# Patient Record
Sex: FEMALE | Race: WHITE | NOT HISPANIC OR LATINO
[De-identification: names, ages, dates, MRNs, and addresses within clinical notes are randomized per-mention and may not be internally consistent; named-entity substitution may affect disease eponyms.]

---

## 2017-02-06 ENCOUNTER — MEDICATION RENEWAL (OUTPATIENT)
Age: 32
End: 2017-02-06

## 2017-03-15 ENCOUNTER — LABORATORY RESULT (OUTPATIENT)
Age: 32
End: 2017-03-15

## 2017-03-16 ENCOUNTER — APPOINTMENT (OUTPATIENT)
Dept: OBGYN | Facility: CLINIC | Age: 32
End: 2017-03-16

## 2017-03-16 VITALS
WEIGHT: 117 LBS | SYSTOLIC BLOOD PRESSURE: 100 MMHG | DIASTOLIC BLOOD PRESSURE: 68 MMHG | HEIGHT: 62 IN | BODY MASS INDEX: 21.53 KG/M2

## 2017-03-23 DIAGNOSIS — Z87.898 PERSONAL HISTORY OF OTHER SPECIFIED CONDITIONS: ICD-10-CM

## 2017-03-23 LAB — HPV HIGH+LOW RISK DNA PNL CVX: POSITIVE

## 2017-09-07 ENCOUNTER — RX RENEWAL (OUTPATIENT)
Age: 32
End: 2017-09-07

## 2017-09-18 ENCOUNTER — MEDICATION RENEWAL (OUTPATIENT)
Age: 32
End: 2017-09-18

## 2017-10-09 ENCOUNTER — MEDICATION RENEWAL (OUTPATIENT)
Age: 32
End: 2017-10-09

## 2017-11-02 ENCOUNTER — APPOINTMENT (OUTPATIENT)
Dept: OBGYN | Facility: CLINIC | Age: 32
End: 2017-11-02
Payer: COMMERCIAL

## 2017-11-02 VITALS
HEIGHT: 62 IN | WEIGHT: 117 LBS | DIASTOLIC BLOOD PRESSURE: 70 MMHG | SYSTOLIC BLOOD PRESSURE: 110 MMHG | BODY MASS INDEX: 21.53 KG/M2

## 2017-11-02 PROCEDURE — 99213 OFFICE O/P EST LOW 20 MIN: CPT

## 2017-11-15 ENCOUNTER — RESULT REVIEW (OUTPATIENT)
Age: 32
End: 2017-11-15

## 2017-11-16 LAB
CYTOLOGY CVX/VAG DOC THIN PREP: NORMAL
HPV HIGH+LOW RISK DNA PNL CVX: DETECTED

## 2017-12-21 ENCOUNTER — APPOINTMENT (OUTPATIENT)
Dept: OBGYN | Facility: CLINIC | Age: 32
End: 2017-12-21
Payer: COMMERCIAL

## 2017-12-21 VITALS — DIASTOLIC BLOOD PRESSURE: 70 MMHG | SYSTOLIC BLOOD PRESSURE: 110 MMHG

## 2017-12-21 LAB
HCG UR QL: NEGATIVE
QUALITY CONTROL: YES

## 2017-12-21 PROCEDURE — 57454 BX/CURETT OF CERVIX W/SCOPE: CPT

## 2017-12-21 PROCEDURE — 81025 URINE PREGNANCY TEST: CPT

## 2018-01-05 LAB — CORE LAB BIOPSY: NORMAL

## 2018-05-24 ENCOUNTER — APPOINTMENT (OUTPATIENT)
Dept: OBGYN | Facility: CLINIC | Age: 33
End: 2018-05-24
Payer: COMMERCIAL

## 2018-05-24 VITALS
HEIGHT: 62 IN | WEIGHT: 125 LBS | SYSTOLIC BLOOD PRESSURE: 110 MMHG | DIASTOLIC BLOOD PRESSURE: 70 MMHG | BODY MASS INDEX: 23 KG/M2

## 2018-05-24 PROCEDURE — 99395 PREV VISIT EST AGE 18-39: CPT

## 2018-05-30 LAB
CYTOLOGY CVX/VAG DOC THIN PREP: NORMAL
HPV HIGH+LOW RISK DNA PNL CVX: NOT DETECTED

## 2018-11-26 ENCOUNTER — MEDICATION RENEWAL (OUTPATIENT)
Age: 33
End: 2018-11-26

## 2018-12-19 ENCOUNTER — MEDICATION RENEWAL (OUTPATIENT)
Age: 33
End: 2018-12-19

## 2018-12-26 ENCOUNTER — MEDICATION RENEWAL (OUTPATIENT)
Age: 33
End: 2018-12-26

## 2019-01-17 ENCOUNTER — APPOINTMENT (OUTPATIENT)
Dept: OBGYN | Facility: CLINIC | Age: 34
End: 2019-01-17
Payer: COMMERCIAL

## 2019-01-17 VITALS
BODY MASS INDEX: 22.26 KG/M2 | DIASTOLIC BLOOD PRESSURE: 65 MMHG | WEIGHT: 121 LBS | SYSTOLIC BLOOD PRESSURE: 100 MMHG | HEIGHT: 62 IN

## 2019-01-17 PROCEDURE — 99213 OFFICE O/P EST LOW 20 MIN: CPT

## 2019-01-17 NOTE — HISTORY OF PRESENT ILLNESS
[6 Months Ago] : 6 months ago [Good] : being in good health [Healthy Diet] : a healthy diet [Regular Exercise] : regular exercise [Reproductive Age] : is of reproductive age [Sexually Active] : is sexually active [Menstrual Problems] : reports normal menses

## 2019-01-17 NOTE — PHYSICAL EXAM
[Awake] : awake [Alert] : alert [Soft] : soft [Oriented x3] : oriented to person, place, and time [Acute Distress] : no acute distress [Mass] : no breast mass [Nipple Discharge] : no nipple discharge [Axillary LAD] : no axillary lymphadenopathy [Tender] : non tender

## 2019-01-22 ENCOUNTER — MEDICATION RENEWAL (OUTPATIENT)
Age: 34
End: 2019-01-22

## 2019-05-30 ENCOUNTER — APPOINTMENT (OUTPATIENT)
Dept: OBGYN | Facility: CLINIC | Age: 34
End: 2019-05-30
Payer: COMMERCIAL

## 2019-05-30 ENCOUNTER — LABORATORY RESULT (OUTPATIENT)
Age: 34
End: 2019-05-30

## 2019-05-30 VITALS
WEIGHT: 124 LBS | DIASTOLIC BLOOD PRESSURE: 60 MMHG | BODY MASS INDEX: 22.82 KG/M2 | SYSTOLIC BLOOD PRESSURE: 110 MMHG | HEIGHT: 62 IN

## 2019-05-30 PROCEDURE — 99395 PREV VISIT EST AGE 18-39: CPT

## 2019-05-30 NOTE — COUNSELING
[Breast Self Exam] : breast self exam [Nutrition] : nutrition [Exercise] : exercise [Vitamins/Supplements] : vitamins/supplements [Contraception] : contraception [Preconception Care] : preconception care

## 2019-05-30 NOTE — HISTORY OF PRESENT ILLNESS
[1 Year Ago] : 1 year ago [Good] : being in good health [Healthy Diet] : a healthy diet [Regular Exercise] : regular exercise [Weight Concerns] : no concerns with her weight [Menstrual Problems] : reports normal menses [Up to Date] : up to date with ~his/her~ STD screening [Sexually Active] : is sexually active [Monogamous] : is monogamous [Male ___] : [unfilled] male

## 2019-06-10 LAB
CYTOLOGY CVX/VAG DOC THIN PREP: NORMAL
HPV HIGH+LOW RISK DNA PNL CVX: DETECTED

## 2019-07-08 ENCOUNTER — RX RENEWAL (OUTPATIENT)
Age: 34
End: 2019-07-08

## 2020-03-19 ENCOUNTER — APPOINTMENT (OUTPATIENT)
Dept: OBGYN | Facility: CLINIC | Age: 35
End: 2020-03-19

## 2020-06-04 ENCOUNTER — APPOINTMENT (OUTPATIENT)
Dept: OBGYN | Facility: CLINIC | Age: 35
End: 2020-06-04

## 2020-06-10 ENCOUNTER — APPOINTMENT (OUTPATIENT)
Dept: OBGYN | Facility: CLINIC | Age: 35
End: 2020-06-10
Payer: COMMERCIAL

## 2020-06-10 ENCOUNTER — RESULT CHARGE (OUTPATIENT)
Age: 35
End: 2020-06-10

## 2020-06-10 VITALS
DIASTOLIC BLOOD PRESSURE: 72 MMHG | WEIGHT: 125 LBS | TEMPERATURE: 98.4 F | SYSTOLIC BLOOD PRESSURE: 110 MMHG | HEIGHT: 62 IN | BODY MASS INDEX: 23 KG/M2

## 2020-06-10 LAB
HCG UR QL: POSITIVE
QUALITY CONTROL: YES

## 2020-06-10 PROCEDURE — 76830 TRANSVAGINAL US NON-OB: CPT

## 2020-06-10 PROCEDURE — 99395 PREV VISIT EST AGE 18-39: CPT | Mod: 25

## 2020-06-10 PROCEDURE — 81025 URINE PREGNANCY TEST: CPT

## 2020-06-10 NOTE — HISTORY OF PRESENT ILLNESS
[1 Year Ago] : 1 year ago [Good] : being in good health [Healthy Diet] : a healthy diet [Regular Exercise] : regular exercise [Weight Concerns] : no concerns with her weight [Menstrual Problems] : reports normal menses [Up to Date] : up to date with ~his/her~ STD screening [Monogamous] : is monogamous [Sexually Active] : is sexually active [Male ___] : [unfilled] male

## 2020-06-10 NOTE — COUNSELING
[Breast Self Exam] : breast self exam [Exercise] : exercise [Nutrition] : nutrition [Vitamins/Supplements] : vitamins/supplements [Contraception] : contraception [Preconception Care] : preconception care

## 2020-06-10 NOTE — REVIEW OF SYSTEMS
[Fever] : no fever [Pain] : no pain [Nosebleeds] : no nosebleeds [Nasal Discharge] : no nasal discharge [Cough] : no cough [SOB on Exertion] : no shortness of breath during exertion [Abdominal Pain] : no abdominal pain [Constipation] : no constipation [Diarrhea] : no diarrhea [Melena] : no melena [Nl] : Integumentary

## 2020-06-10 NOTE — CHIEF COMPLAINT
[Follow Up] : follow up GYN visit [FreeTextEntry1] : 34 yo G0 with LMP 4/16 presents with amenorrhea.

## 2020-06-16 LAB
C TRACH RRNA SPEC QL NAA+PROBE: NOT DETECTED
HPV HIGH+LOW RISK DNA PNL CVX: NOT DETECTED
N GONORRHOEA RRNA SPEC QL NAA+PROBE: NOT DETECTED
SOURCE AMPLIFICATION: NORMAL

## 2020-06-24 ENCOUNTER — APPOINTMENT (OUTPATIENT)
Dept: OBGYN | Facility: CLINIC | Age: 35
End: 2020-06-24
Payer: COMMERCIAL

## 2020-06-24 ENCOUNTER — NON-APPOINTMENT (OUTPATIENT)
Age: 35
End: 2020-06-24

## 2020-06-24 VITALS
SYSTOLIC BLOOD PRESSURE: 110 MMHG | TEMPERATURE: 98.7 F | WEIGHT: 127 LBS | HEIGHT: 62 IN | DIASTOLIC BLOOD PRESSURE: 68 MMHG | BODY MASS INDEX: 23.37 KG/M2

## 2020-06-24 PROCEDURE — 36415 COLL VENOUS BLD VENIPUNCTURE: CPT

## 2020-06-24 PROCEDURE — 0501F PRENATAL FLOW SHEET: CPT

## 2020-06-24 PROCEDURE — 76817 TRANSVAGINAL US OBSTETRIC: CPT

## 2020-06-24 PROCEDURE — 99213 OFFICE O/P EST LOW 20 MIN: CPT

## 2020-06-24 RX ORDER — NORGESTREL AND ETHINYL ESTRADIOL 0.3-0.03MG
0.3-3 KIT ORAL
Qty: 84 | Refills: 1 | Status: COMPLETED | COMMUNITY
Start: 2019-07-08 | End: 2020-06-24

## 2020-06-25 ENCOUNTER — TRANSCRIPTION ENCOUNTER (OUTPATIENT)
Age: 35
End: 2020-06-25

## 2020-06-26 DIAGNOSIS — B97.7 PAPILLOMAVIRUS AS THE CAUSE OF DISEASES CLASSIFIED ELSEWHERE: ICD-10-CM

## 2020-06-30 LAB
ABO + RH PNL BLD: NORMAL
AR GENE MUT ANL BLD/T: NEGATIVE
B19V IGG SER QL IA: 3.7 INDEX
B19V IGG+IGM SER-IMP: NORMAL
B19V IGG+IGM SER-IMP: POSITIVE
B19V IGM FLD-ACNC: 0.1
B19V IGM SER-ACNC: NEGATIVE
BASOPHILS # BLD AUTO: 0.01 K/UL
BASOPHILS NFR BLD AUTO: 0.1 %
BLD GP AB SCN SERPL QL: NORMAL
CFTR MUT TESTED BLD/T: NEGATIVE
EOSINOPHIL # BLD AUTO: 0.08 K/UL
EOSINOPHIL NFR BLD AUTO: 1.2 %
FMR1 GENE MUT ANL BLD/T: NORMAL
HBV SURFACE AG SER QL: NONREACTIVE
HCT VFR BLD CALC: 41.1 %
HCV AB SER QL: NONREACTIVE
HCV S/CO RATIO: 0.15 S/CO
HGB A MFR BLD: 97.5 %
HGB A2 MFR BLD: 2.5 %
HGB BLD-MCNC: 13.6 G/DL
HGB FRACT BLD-IMP: NORMAL
HIV1+2 AB SPEC QL IA.RAPID: NONREACTIVE
IMM GRANULOCYTES NFR BLD AUTO: 0.3 %
LYMPHOCYTES # BLD AUTO: 1.44 K/UL
LYMPHOCYTES NFR BLD AUTO: 21.4 %
MAN DIFF?: NORMAL
MCHC RBC-ENTMCNC: 30.9 PG
MCHC RBC-ENTMCNC: 33.1 GM/DL
MCV RBC AUTO: 93.4 FL
MONOCYTES # BLD AUTO: 0.51 K/UL
MONOCYTES NFR BLD AUTO: 7.6 %
NEUTROPHILS # BLD AUTO: 4.68 K/UL
NEUTROPHILS NFR BLD AUTO: 69.4 %
PLATELET # BLD AUTO: 310 K/UL
RBC # BLD: 4.4 M/UL
RBC # FLD: 11.9 %
RUBV IGG FLD-ACNC: 2 INDEX
RUBV IGG SER-IMP: POSITIVE
T GONDII AB SER-IMP: NEGATIVE
T GONDII AB SER-IMP: NEGATIVE
T GONDII IGG SER QL: <3 IU/ML
T GONDII IGM SER QL: <3 AU/ML
T PALLIDUM AB SER QL IA: NEGATIVE
TSH SERPL-ACNC: 1.63 UIU/ML
WBC # FLD AUTO: 6.74 K/UL

## 2020-07-10 ENCOUNTER — ASOB RESULT (OUTPATIENT)
Age: 35
End: 2020-07-10

## 2020-07-10 ENCOUNTER — APPOINTMENT (OUTPATIENT)
Dept: ANTEPARTUM | Facility: CLINIC | Age: 35
End: 2020-07-10
Payer: COMMERCIAL

## 2020-07-10 PROCEDURE — 76801 OB US < 14 WKS SINGLE FETUS: CPT

## 2020-07-10 PROCEDURE — 36416 COLLJ CAPILLARY BLOOD SPEC: CPT

## 2020-07-10 PROCEDURE — 76813 OB US NUCHAL MEAS 1 GEST: CPT | Mod: 59

## 2020-07-17 ENCOUNTER — APPOINTMENT (OUTPATIENT)
Dept: OBGYN | Facility: CLINIC | Age: 35
End: 2020-07-17
Payer: COMMERCIAL

## 2020-07-17 ENCOUNTER — NON-APPOINTMENT (OUTPATIENT)
Age: 35
End: 2020-07-17

## 2020-07-17 VITALS
DIASTOLIC BLOOD PRESSURE: 72 MMHG | SYSTOLIC BLOOD PRESSURE: 104 MMHG | TEMPERATURE: 98.6 F | WEIGHT: 127 LBS | HEIGHT: 62 IN | BODY MASS INDEX: 23.37 KG/M2

## 2020-07-17 LAB
BILIRUB UR QL STRIP: NORMAL
CLARITY UR: CLEAR
COLLECTION METHOD: NORMAL
GLUCOSE UR-MCNC: NORMAL
HCG UR QL: 0.2 EU/DL
HGB UR QL STRIP.AUTO: NORMAL
KETONES UR-MCNC: NORMAL
LEUKOCYTE ESTERASE UR QL STRIP: NORMAL
NITRITE UR QL STRIP: NORMAL
PH UR STRIP: 7
PROT UR STRIP-MCNC: NORMAL
SP GR UR STRIP: 1.01

## 2020-07-17 PROCEDURE — 0502F SUBSEQUENT PRENATAL CARE: CPT

## 2020-07-17 PROCEDURE — 81003 URINALYSIS AUTO W/O SCOPE: CPT | Mod: QW

## 2020-07-17 PROCEDURE — 59425 ANTEPARTUM CARE ONLY: CPT

## 2020-07-17 PROCEDURE — 36415 COLL VENOUS BLD VENIPUNCTURE: CPT

## 2020-07-23 ENCOUNTER — TRANSCRIPTION ENCOUNTER (OUTPATIENT)
Age: 35
End: 2020-07-23

## 2020-07-24 ENCOUNTER — TRANSCRIPTION ENCOUNTER (OUTPATIENT)
Age: 35
End: 2020-07-24

## 2020-07-30 LAB
CLARIM 15Q11.2: NORMAL
CLARIM 1P36: NORMAL
CLARIM 22Q11.2: NORMAL
CLARIM 4P-/WOLF-HIRSCHHORN: NORMAL
CLARIM 5P-/CRI DU CHAT: NORMAL
CLARIM ADDITIONAL INFO: NORMAL
CLARIM CHROMOSOME 13: NORMAL
CLARIM CHROMOSOME 18: NORMAL
CLARIM CHROMOSOME 21: NORMAL
CLARIM SEX CHROMOSOMES: NORMAL
CLARITEST NIPT W/MICRO: NORMAL

## 2020-08-18 ENCOUNTER — NON-APPOINTMENT (OUTPATIENT)
Age: 35
End: 2020-08-18

## 2020-08-18 ENCOUNTER — APPOINTMENT (OUTPATIENT)
Dept: OBGYN | Facility: CLINIC | Age: 35
End: 2020-08-18
Payer: COMMERCIAL

## 2020-08-18 VITALS
TEMPERATURE: 99 F | DIASTOLIC BLOOD PRESSURE: 74 MMHG | SYSTOLIC BLOOD PRESSURE: 110 MMHG | HEIGHT: 62 IN | BODY MASS INDEX: 24.48 KG/M2 | WEIGHT: 133 LBS

## 2020-08-18 PROCEDURE — 0502F SUBSEQUENT PRENATAL CARE: CPT

## 2020-08-18 PROCEDURE — 36415 COLL VENOUS BLD VENIPUNCTURE: CPT

## 2020-08-21 LAB
1ST TRIMESTER DATA: NORMAL
2ND TRIMESTER DATA: NORMAL
AFP PNL SERPL: NORMAL
AFP SERPL-ACNC: NORMAL
AFP SERPL-ACNC: NORMAL
B-HCG FREE SERPL-MCNC: NORMAL
CLINICAL BIOCHEMIST REVIEW: NORMAL
FREE BETA HCG 1ST TRIMESTER: NORMAL
INHIBIN A SERPL-MCNC: NORMAL
NOTES NTD: NORMAL
NT: NORMAL
PAPP-A SERPL-ACNC: NORMAL
U ESTRIOL SERPL-SCNC: NORMAL

## 2020-08-24 ENCOUNTER — TRANSCRIPTION ENCOUNTER (OUTPATIENT)
Age: 35
End: 2020-08-24

## 2020-08-31 ENCOUNTER — TRANSCRIPTION ENCOUNTER (OUTPATIENT)
Age: 35
End: 2020-08-31

## 2020-09-01 ENCOUNTER — TRANSCRIPTION ENCOUNTER (OUTPATIENT)
Age: 35
End: 2020-09-01

## 2020-09-04 ENCOUNTER — APPOINTMENT (OUTPATIENT)
Dept: ANTEPARTUM | Facility: CLINIC | Age: 35
End: 2020-09-04
Payer: COMMERCIAL

## 2020-09-04 ENCOUNTER — ASOB RESULT (OUTPATIENT)
Age: 35
End: 2020-09-04

## 2020-09-04 PROCEDURE — 76805 OB US >/= 14 WKS SNGL FETUS: CPT

## 2020-09-17 ENCOUNTER — NON-APPOINTMENT (OUTPATIENT)
Age: 35
End: 2020-09-17

## 2020-09-17 ENCOUNTER — APPOINTMENT (OUTPATIENT)
Dept: OBGYN | Facility: CLINIC | Age: 35
End: 2020-09-17
Payer: COMMERCIAL

## 2020-09-17 LAB
BILIRUB UR QL STRIP: NORMAL
CLARITY UR: CLEAR
COLLECTION METHOD: NORMAL
GLUCOSE UR-MCNC: NORMAL
HCG UR QL: 0.2 EU/DL
HGB UR QL STRIP.AUTO: NORMAL
KETONES UR-MCNC: NORMAL
LEUKOCYTE ESTERASE UR QL STRIP: NORMAL
NITRITE UR QL STRIP: NORMAL
PH UR STRIP: 7.5
PROT UR STRIP-MCNC: NORMAL
SP GR UR STRIP: 1.01

## 2020-09-17 PROCEDURE — 81003 URINALYSIS AUTO W/O SCOPE: CPT | Mod: QW

## 2020-09-17 PROCEDURE — 0502F SUBSEQUENT PRENATAL CARE: CPT

## 2020-09-18 ENCOUNTER — NON-APPOINTMENT (OUTPATIENT)
Age: 35
End: 2020-09-18

## 2020-09-28 ENCOUNTER — TRANSCRIPTION ENCOUNTER (OUTPATIENT)
Age: 35
End: 2020-09-28

## 2020-10-15 ENCOUNTER — NON-APPOINTMENT (OUTPATIENT)
Age: 35
End: 2020-10-15

## 2020-10-15 ENCOUNTER — APPOINTMENT (OUTPATIENT)
Dept: OBGYN | Facility: CLINIC | Age: 35
End: 2020-10-15
Payer: COMMERCIAL

## 2020-10-15 VITALS
DIASTOLIC BLOOD PRESSURE: 70 MMHG | SYSTOLIC BLOOD PRESSURE: 104 MMHG | WEIGHT: 140 LBS | BODY MASS INDEX: 25.76 KG/M2 | HEIGHT: 62 IN | TEMPERATURE: 98.9 F

## 2020-10-15 PROCEDURE — 90686 IIV4 VACC NO PRSV 0.5 ML IM: CPT

## 2020-10-15 PROCEDURE — 0502F SUBSEQUENT PRENATAL CARE: CPT

## 2020-10-15 PROCEDURE — 90471 IMMUNIZATION ADMIN: CPT

## 2020-10-18 LAB
BASOPHILS # BLD AUTO: 0.03 K/UL
BASOPHILS NFR BLD AUTO: 0.4 %
EOSINOPHIL # BLD AUTO: 0.11 K/UL
EOSINOPHIL NFR BLD AUTO: 1.5 %
GLUCOSE 1H P 50 G GLC PO SERPL-MCNC: 144 MG/DL
HCT VFR BLD CALC: 33.2 %
HGB BLD-MCNC: 11.2 G/DL
IMM GRANULOCYTES NFR BLD AUTO: 0.3 %
LYMPHOCYTES # BLD AUTO: 1.06 K/UL
LYMPHOCYTES NFR BLD AUTO: 14.9 %
MAN DIFF?: NORMAL
MCHC RBC-ENTMCNC: 31.5 PG
MCHC RBC-ENTMCNC: 33.7 GM/DL
MCV RBC AUTO: 93.3 FL
MONOCYTES # BLD AUTO: 0.49 K/UL
MONOCYTES NFR BLD AUTO: 6.9 %
NEUTROPHILS # BLD AUTO: 5.41 K/UL
NEUTROPHILS NFR BLD AUTO: 76 %
PLATELET # BLD AUTO: 259 K/UL
RBC # BLD: 3.56 M/UL
RBC # FLD: 12.6 %
SARS-COV-2 IGG SERPL IA-ACNC: 7.17 INDEX
SARS-COV-2 IGG SERPL QL IA: POSITIVE
WBC # FLD AUTO: 7.12 K/UL

## 2020-10-19 ENCOUNTER — TRANSCRIPTION ENCOUNTER (OUTPATIENT)
Age: 35
End: 2020-10-19

## 2020-10-19 ENCOUNTER — NON-APPOINTMENT (OUTPATIENT)
Age: 35
End: 2020-10-19

## 2020-10-22 LAB
GLUCOSE 1H P 100 G GLC PO SERPL-MCNC: 111 MG/DL
GLUCOSE 2H P CHAL SERPL-MCNC: 105 MG/DL
GLUCOSE 3H P CHAL SERPL-MCNC: 96 MG/DL
GLUCOSE BS SERPL-MCNC: 76 MG/DL

## 2020-11-03 ENCOUNTER — APPOINTMENT (OUTPATIENT)
Dept: ANTEPARTUM | Facility: CLINIC | Age: 35
End: 2020-11-03
Payer: COMMERCIAL

## 2020-11-03 ENCOUNTER — ASOB RESULT (OUTPATIENT)
Age: 35
End: 2020-11-03

## 2020-11-03 PROCEDURE — 76819 FETAL BIOPHYS PROFIL W/O NST: CPT

## 2020-11-03 PROCEDURE — 76816 OB US FOLLOW-UP PER FETUS: CPT

## 2020-11-03 PROCEDURE — 99072 ADDL SUPL MATRL&STAF TM PHE: CPT

## 2020-11-04 ENCOUNTER — NON-APPOINTMENT (OUTPATIENT)
Age: 35
End: 2020-11-04

## 2020-11-11 ENCOUNTER — APPOINTMENT (OUTPATIENT)
Dept: OBGYN | Facility: CLINIC | Age: 35
End: 2020-11-11
Payer: COMMERCIAL

## 2020-11-11 ENCOUNTER — NON-APPOINTMENT (OUTPATIENT)
Age: 35
End: 2020-11-11

## 2020-11-11 VITALS
WEIGHT: 143 LBS | RESPIRATION RATE: 16 BRPM | SYSTOLIC BLOOD PRESSURE: 102 MMHG | HEIGHT: 62 IN | BODY MASS INDEX: 26.31 KG/M2 | DIASTOLIC BLOOD PRESSURE: 54 MMHG

## 2020-11-11 PROCEDURE — 0502F SUBSEQUENT PRENATAL CARE: CPT

## 2020-12-05 ENCOUNTER — OUTPATIENT (OUTPATIENT)
Dept: INPATIENT UNIT | Facility: HOSPITAL | Age: 35
LOS: 1 days | Discharge: HOME | End: 2020-12-05
Payer: COMMERCIAL

## 2020-12-05 DIAGNOSIS — O26.899 OTHER SPECIFIED PREGNANCY RELATED CONDITIONS, UNSPECIFIED TRIMESTER: ICD-10-CM

## 2020-12-05 LAB
APPEARANCE UR: CLEAR — SIGNIFICANT CHANGE UP
APTT BLD: 27 SEC — LOW (ref 27.5–35.5)
BASOPHILS # BLD AUTO: 0.03 K/UL — SIGNIFICANT CHANGE UP (ref 0–0.2)
BASOPHILS NFR BLD AUTO: 0.3 % — SIGNIFICANT CHANGE UP (ref 0–2)
BILIRUB UR-MCNC: NEGATIVE — SIGNIFICANT CHANGE UP
COLOR SPEC: YELLOW — SIGNIFICANT CHANGE UP
DIFF PNL FLD: NEGATIVE — SIGNIFICANT CHANGE UP
EOSINOPHIL # BLD AUTO: 0.1 K/UL — SIGNIFICANT CHANGE UP (ref 0–0.5)
EOSINOPHIL NFR BLD AUTO: 0.9 % — SIGNIFICANT CHANGE UP (ref 0–6)
GLUCOSE UR QL: NEGATIVE MG/DL — SIGNIFICANT CHANGE UP
HCT VFR BLD CALC: 34.2 % — LOW (ref 34.5–45)
HGB BLD-MCNC: 11.7 G/DL — SIGNIFICANT CHANGE UP (ref 11.5–15.5)
IMM GRANULOCYTES NFR BLD AUTO: 0.7 % — SIGNIFICANT CHANGE UP (ref 0–1.5)
INR BLD: 1.03 RATIO — SIGNIFICANT CHANGE UP (ref 0.88–1.16)
KETONES UR-MCNC: ABNORMAL
LEUKOCYTE ESTERASE UR-ACNC: NEGATIVE — SIGNIFICANT CHANGE UP
LYMPHOCYTES # BLD AUTO: 1.54 K/UL — SIGNIFICANT CHANGE UP (ref 1–3.3)
LYMPHOCYTES # BLD AUTO: 14.4 % — SIGNIFICANT CHANGE UP (ref 13–44)
MCHC RBC-ENTMCNC: 31.2 PG — SIGNIFICANT CHANGE UP (ref 27–34)
MCHC RBC-ENTMCNC: 34.2 GM/DL — SIGNIFICANT CHANGE UP (ref 32–36)
MCV RBC AUTO: 91.2 FL — SIGNIFICANT CHANGE UP (ref 80–100)
MONOCYTES # BLD AUTO: 0.85 K/UL — SIGNIFICANT CHANGE UP (ref 0–0.9)
MONOCYTES NFR BLD AUTO: 8 % — SIGNIFICANT CHANGE UP (ref 2–14)
NEUTROPHILS # BLD AUTO: 8.1 K/UL — HIGH (ref 1.8–7.4)
NEUTROPHILS NFR BLD AUTO: 75.7 % — SIGNIFICANT CHANGE UP (ref 43–77)
NITRITE UR-MCNC: NEGATIVE — SIGNIFICANT CHANGE UP
PH UR: 7 — SIGNIFICANT CHANGE UP (ref 5–8)
PLATELET # BLD AUTO: 295 K/UL — SIGNIFICANT CHANGE UP (ref 150–400)
PROT UR-MCNC: NEGATIVE MG/DL — SIGNIFICANT CHANGE UP
PROTHROM AB SERPL-ACNC: 11.9 SEC — SIGNIFICANT CHANGE UP (ref 10.6–13.6)
RBC # BLD: 3.75 M/UL — LOW (ref 3.8–5.2)
RBC # FLD: 12 % — SIGNIFICANT CHANGE UP (ref 10.3–14.5)
SP GR SPEC: 1.01 — SIGNIFICANT CHANGE UP (ref 1.01–1.02)
UROBILINOGEN FLD QL: NEGATIVE MG/DL — SIGNIFICANT CHANGE UP
WBC # BLD: 10.69 K/UL — HIGH (ref 3.8–10.5)
WBC # FLD AUTO: 10.69 K/UL — HIGH (ref 3.8–10.5)

## 2020-12-05 PROCEDURE — 76817 TRANSVAGINAL US OBSTETRIC: CPT | Mod: 26

## 2020-12-05 PROCEDURE — 36415 COLL VENOUS BLD VENIPUNCTURE: CPT | Mod: 91

## 2020-12-05 PROCEDURE — 87653 STREP B DNA AMP PROBE: CPT

## 2020-12-05 PROCEDURE — 85460 HEMOGLOBIN FETAL: CPT

## 2020-12-05 PROCEDURE — 87491 CHLMYD TRACH DNA AMP PROBE: CPT

## 2020-12-05 PROCEDURE — 87591 N.GONORRHOEAE DNA AMP PROB: CPT

## 2020-12-05 PROCEDURE — U0003: CPT

## 2020-12-05 PROCEDURE — 86900 BLOOD TYPING SEROLOGIC ABO: CPT

## 2020-12-05 PROCEDURE — G0463: CPT

## 2020-12-05 PROCEDURE — 76830 TRANSVAGINAL US NON-OB: CPT

## 2020-12-05 PROCEDURE — 86850 RBC ANTIBODY SCREEN: CPT

## 2020-12-05 PROCEDURE — 86901 BLOOD TYPING SEROLOGIC RH(D): CPT

## 2020-12-05 PROCEDURE — 81003 URINALYSIS AUTO W/O SCOPE: CPT

## 2020-12-05 PROCEDURE — 85730 THROMBOPLASTIN TIME PARTIAL: CPT

## 2020-12-05 PROCEDURE — 87086 URINE CULTURE/COLONY COUNT: CPT

## 2020-12-05 PROCEDURE — 85025 COMPLETE CBC W/AUTO DIFF WBC: CPT

## 2020-12-05 PROCEDURE — 59025 FETAL NON-STRESS TEST: CPT

## 2020-12-05 PROCEDURE — 85610 PROTHROMBIN TIME: CPT

## 2020-12-05 PROCEDURE — 76805 OB US >/= 14 WKS SNGL FETUS: CPT

## 2020-12-05 PROCEDURE — 76805 OB US >/= 14 WKS SNGL FETUS: CPT | Mod: 26

## 2020-12-05 RX ORDER — AMPICILLIN TRIHYDRATE 250 MG
2 CAPSULE ORAL ONCE
Refills: 0 | Status: COMPLETED | OUTPATIENT
Start: 2020-12-05 | End: 2020-12-05

## 2020-12-05 RX ORDER — NIFEDIPINE 30 MG
10 TABLET, EXTENDED RELEASE 24 HR ORAL
Refills: 0 | Status: DISCONTINUED | OUTPATIENT
Start: 2020-12-05 | End: 2020-12-06

## 2020-12-05 RX ORDER — AMPICILLIN TRIHYDRATE 250 MG
1 CAPSULE ORAL EVERY 4 HOURS
Refills: 0 | Status: DISCONTINUED | OUTPATIENT
Start: 2020-12-05 | End: 2020-12-06

## 2020-12-05 RX ORDER — NIFEDIPINE 30 MG
20 TABLET, EXTENDED RELEASE 24 HR ORAL ONCE
Refills: 0 | Status: COMPLETED | OUTPATIENT
Start: 2020-12-05 | End: 2020-12-05

## 2020-12-05 RX ORDER — SODIUM CHLORIDE 9 MG/ML
1000 INJECTION, SOLUTION INTRAVENOUS
Refills: 0 | Status: DISCONTINUED | OUTPATIENT
Start: 2020-12-05 | End: 2020-12-06

## 2020-12-05 RX ORDER — NIFEDIPINE 30 MG
10 TABLET, EXTENDED RELEASE 24 HR ORAL ONCE
Refills: 0 | Status: COMPLETED | OUTPATIENT
Start: 2020-12-05 | End: 2020-12-05

## 2020-12-05 RX ORDER — SODIUM CHLORIDE 9 MG/ML
1000 INJECTION, SOLUTION INTRAVENOUS ONCE
Refills: 0 | Status: COMPLETED | OUTPATIENT
Start: 2020-12-05 | End: 2020-12-05

## 2020-12-05 RX ORDER — NIFEDIPINE 30 MG
10 TABLET, EXTENDED RELEASE 24 HR ORAL EVERY 4 HOURS
Refills: 0 | Status: DISCONTINUED | OUTPATIENT
Start: 2020-12-05 | End: 2020-12-05

## 2020-12-05 RX ADMIN — SODIUM CHLORIDE 1000 MILLILITER(S): 9 INJECTION, SOLUTION INTRAVENOUS at 15:59

## 2020-12-05 RX ADMIN — Medication 10 MILLIGRAM(S): at 16:49

## 2020-12-05 RX ADMIN — Medication 20 MILLIGRAM(S): at 15:59

## 2020-12-05 RX ADMIN — Medication 108 GRAM(S): at 20:36

## 2020-12-05 RX ADMIN — Medication 12 MILLIGRAM(S): at 16:21

## 2020-12-05 RX ADMIN — Medication 216 GRAM(S): at 16:09

## 2020-12-05 RX ADMIN — Medication 10 MILLIGRAM(S): at 23:11

## 2020-12-06 LAB
CULTURE RESULTS: NO GROWTH — SIGNIFICANT CHANGE UP
GROUP B BETA STREP DNA (PCR): DETECTED
GROUP B BETA STREP INTERPRETATION: SIGNIFICANT CHANGE UP
KLEIHAUER-BETKE CALCULATION: 0 % — SIGNIFICANT CHANGE UP (ref 0–0.3)
SARS-COV-2 RNA SPEC QL NAA+PROBE: SIGNIFICANT CHANGE UP
SOURCE GROUP B STREP: SIGNIFICANT CHANGE UP
SPECIMEN SOURCE: SIGNIFICANT CHANGE UP

## 2020-12-06 RX ORDER — NIFEDIPINE 30 MG
10 TABLET, EXTENDED RELEASE 24 HR ORAL EVERY 6 HOURS
Refills: 0 | Status: DISCONTINUED | OUTPATIENT
Start: 2020-12-06 | End: 2020-12-06

## 2020-12-06 RX ADMIN — Medication 10 MILLIGRAM(S): at 09:51

## 2020-12-06 RX ADMIN — Medication 108 GRAM(S): at 00:06

## 2020-12-06 RX ADMIN — Medication 6 MILLIGRAM(S): at 16:25

## 2020-12-06 RX ADMIN — Medication 108 GRAM(S): at 04:00

## 2020-12-06 RX ADMIN — Medication 108 GRAM(S): at 09:52

## 2020-12-06 NOTE — DISCHARGE NOTE ANTEPARTUM - PLAN OF CARE
Resolution of pre-term contractions Patient is at 33w+ who presented to L&D for  contractions. She was given procardia and contractions have resolved. She denies any pain at this time and received a full course of betamethasone.

## 2020-12-06 NOTE — DISCHARGE NOTE ANTEPARTUM - PATIENT PORTAL LINK FT
You can access the FollowMyHealth Patient Portal offered by [facility] by registering at the following website: [email or web address]. By joining [employer]’s FollowMyHealth portal, you will also be able to view your health information using other applications (apps) compatible with our system.

## 2020-12-06 NOTE — DISCHARGE NOTE ANTEPARTUM - CARE PROVIDER_API CALL
JAMES BENAVIDES  OBSTETRICS AND GYNECOLOGY  [address], [address]  [place] [address]  Phone: [phone]  Fax: [phone]  Follow Up Time: 1 week

## 2020-12-06 NOTE — DISCHARGE NOTE ANTEPARTUM - CARE PLAN
Goal:	Resolution of pre-term contractions  Assessment and plan of treatment:	Patient is at 33w+ who presented to L&D for  contractions. She was given procardia and contractions have resolved. She denies any pain at this time and received a full course of betamethasone.   Principal Discharge DX:	 labor in third trimester without delivery  Goal:	Resolution of pre-term contractions  Assessment and plan of treatment:	Patient is at 33w+ who presented to L&D for  contractions. She was given procardia and contractions have resolved. She denies any pain at this time and received a full course of betamethasone.

## 2020-12-06 NOTE — DISCHARGE NOTE ANTEPARTUM - CARE PROVIDERS DIRECT ADDRESSES
[email or web address]

## 2020-12-06 NOTE — DISCHARGE NOTE ANTEPARTUM - HOSPITAL COURSE
Patient is a 35y who presented to L&D for  contractions at 33w+. She was given procardia for tocolysis while she received a course of betamethasone. She is no longer having contractions and is more comfortable.

## 2020-12-07 DIAGNOSIS — O47.9 FALSE LABOR, UNSPECIFIED: ICD-10-CM

## 2020-12-07 LAB
C TRACH RRNA SPEC QL NAA+PROBE: SIGNIFICANT CHANGE UP
N GONORRHOEA RRNA SPEC QL NAA+PROBE: SIGNIFICANT CHANGE UP

## 2020-12-08 ENCOUNTER — TRANSCRIPTION ENCOUNTER (OUTPATIENT)
Age: 35
End: 2020-12-08

## 2020-12-09 ENCOUNTER — APPOINTMENT (OUTPATIENT)
Dept: OBGYN | Facility: CLINIC | Age: 35
End: 2020-12-09
Payer: COMMERCIAL

## 2020-12-09 ENCOUNTER — NON-APPOINTMENT (OUTPATIENT)
Age: 35
End: 2020-12-09

## 2020-12-09 VITALS
DIASTOLIC BLOOD PRESSURE: 70 MMHG | TEMPERATURE: 97.2 F | BODY MASS INDEX: 27.23 KG/M2 | WEIGHT: 148 LBS | HEIGHT: 62 IN | SYSTOLIC BLOOD PRESSURE: 122 MMHG

## 2020-12-09 LAB
BILIRUB UR QL STRIP: NORMAL
CLARITY UR: CLEAR
COLLECTION METHOD: NORMAL
GLUCOSE UR-MCNC: NORMAL
HCG UR QL: 0.2 EU/DL
HGB UR QL STRIP.AUTO: NORMAL
KETONES UR-MCNC: NORMAL
LEUKOCYTE ESTERASE UR QL STRIP: NORMAL
NITRITE UR QL STRIP: NORMAL
PH UR STRIP: 6.5
PROT UR STRIP-MCNC: NORMAL
SP GR UR STRIP: 1.01

## 2020-12-09 PROCEDURE — 0502F SUBSEQUENT PRENATAL CARE: CPT

## 2020-12-09 PROCEDURE — 59025 FETAL NON-STRESS TEST: CPT

## 2020-12-09 PROCEDURE — 81003 URINALYSIS AUTO W/O SCOPE: CPT | Mod: QW

## 2020-12-11 ENCOUNTER — INPATIENT (INPATIENT)
Facility: HOSPITAL | Age: 35
LOS: 2 days | Discharge: HOME | End: 2020-12-14
Payer: COMMERCIAL

## 2020-12-11 VITALS — HEIGHT: 62 IN | WEIGHT: 147.71 LBS

## 2020-12-11 DIAGNOSIS — Z3A.00 WEEKS OF GESTATION OF PREGNANCY NOT SPECIFIED: ICD-10-CM

## 2020-12-11 LAB — T PALLIDUM AB TITR SER: NEGATIVE — SIGNIFICANT CHANGE UP

## 2020-12-11 PROCEDURE — 86769 SARS-COV-2 COVID-19 ANTIBODY: CPT

## 2020-12-11 PROCEDURE — 36415 COLL VENOUS BLD VENIPUNCTURE: CPT

## 2020-12-11 PROCEDURE — U0003: CPT

## 2020-12-11 PROCEDURE — 85025 COMPLETE CBC W/AUTO DIFF WBC: CPT

## 2020-12-11 PROCEDURE — 88307 TISSUE EXAM BY PATHOLOGIST: CPT

## 2020-12-11 PROCEDURE — 86900 BLOOD TYPING SEROLOGIC ABO: CPT

## 2020-12-11 PROCEDURE — 59410 OBSTETRICAL CARE: CPT

## 2020-12-11 PROCEDURE — 86780 TREPONEMA PALLIDUM: CPT

## 2020-12-11 PROCEDURE — 84112 EVAL AMNIOTIC FLUID PROTEIN: CPT

## 2020-12-11 PROCEDURE — 87075 CULTR BACTERIA EXCEPT BLOOD: CPT

## 2020-12-11 PROCEDURE — 85014 HEMATOCRIT: CPT

## 2020-12-11 PROCEDURE — 87070 CULTURE OTHR SPECIMN AEROBIC: CPT

## 2020-12-11 PROCEDURE — 59050 FETAL MONITOR W/REPORT: CPT

## 2020-12-11 PROCEDURE — 85018 HEMOGLOBIN: CPT

## 2020-12-11 PROCEDURE — C1889: CPT

## 2020-12-11 PROCEDURE — G0463: CPT

## 2020-12-11 PROCEDURE — 86901 BLOOD TYPING SEROLOGIC RH(D): CPT

## 2020-12-11 PROCEDURE — 86850 RBC ANTIBODY SCREEN: CPT

## 2020-12-11 PROCEDURE — 94760 N-INVAS EAR/PLS OXIMETRY 1: CPT

## 2020-12-12 ENCOUNTER — RESULT REVIEW (OUTPATIENT)
Age: 35
End: 2020-12-12

## 2020-12-12 PROCEDURE — 88307 TISSUE EXAM BY PATHOLOGIST: CPT | Mod: 26

## 2020-12-12 RX ORDER — PRAMOXINE HYDROCHLORIDE 150 MG/15G
1 AEROSOL, FOAM RECTAL EVERY 4 HOURS
Refills: 0 | Status: DISCONTINUED | OUTPATIENT
Start: 2020-12-12 | End: 2020-12-14

## 2020-12-12 RX ORDER — SODIUM CHLORIDE 9 MG/ML
3 INJECTION INTRAMUSCULAR; INTRAVENOUS; SUBCUTANEOUS EVERY 8 HOURS
Refills: 0 | Status: DISCONTINUED | OUTPATIENT
Start: 2020-12-12 | End: 2020-12-13

## 2020-12-12 RX ORDER — HYDROCORTISONE 1 %
1 OINTMENT (GRAM) TOPICAL EVERY 6 HOURS
Refills: 0 | Status: DISCONTINUED | OUTPATIENT
Start: 2020-12-12 | End: 2020-12-14

## 2020-12-12 RX ORDER — KETOROLAC TROMETHAMINE 30 MG/ML
30 SYRINGE (ML) INJECTION ONCE
Refills: 0 | Status: DISCONTINUED | OUTPATIENT
Start: 2020-12-12 | End: 2020-12-12

## 2020-12-12 RX ORDER — AER TRAVELER 0.5 G/1
1 SOLUTION RECTAL; TOPICAL EVERY 4 HOURS
Refills: 0 | Status: DISCONTINUED | OUTPATIENT
Start: 2020-12-12 | End: 2020-12-14

## 2020-12-12 RX ORDER — DIBUCAINE 1 %
1 OINTMENT (GRAM) RECTAL EVERY 6 HOURS
Refills: 0 | Status: DISCONTINUED | OUTPATIENT
Start: 2020-12-12 | End: 2020-12-14

## 2020-12-12 RX ORDER — ACETAMINOPHEN 500 MG
975 TABLET ORAL
Refills: 0 | Status: DISCONTINUED | OUTPATIENT
Start: 2020-12-12 | End: 2020-12-14

## 2020-12-12 RX ORDER — TETANUS TOXOID, REDUCED DIPHTHERIA TOXOID AND ACELLULAR PERTUSSIS VACCINE, ADSORBED 5; 2.5; 8; 8; 2.5 [IU]/.5ML; [IU]/.5ML; UG/.5ML; UG/.5ML; UG/.5ML
0.5 SUSPENSION INTRAMUSCULAR ONCE
Refills: 0 | Status: DISCONTINUED | OUTPATIENT
Start: 2020-12-12 | End: 2020-12-14

## 2020-12-12 RX ORDER — LANOLIN
1 OINTMENT (GRAM) TOPICAL EVERY 6 HOURS
Refills: 0 | Status: DISCONTINUED | OUTPATIENT
Start: 2020-12-12 | End: 2020-12-14

## 2020-12-12 RX ORDER — MAGNESIUM HYDROXIDE 400 MG/1
30 TABLET, CHEWABLE ORAL
Refills: 0 | Status: DISCONTINUED | OUTPATIENT
Start: 2020-12-12 | End: 2020-12-14

## 2020-12-12 RX ORDER — DIPHENHYDRAMINE HCL 50 MG
25 CAPSULE ORAL EVERY 6 HOURS
Refills: 0 | Status: DISCONTINUED | OUTPATIENT
Start: 2020-12-12 | End: 2020-12-14

## 2020-12-12 RX ORDER — OXYTOCIN 10 UNIT/ML
333.33 VIAL (ML) INJECTION
Qty: 20 | Refills: 0 | Status: DISCONTINUED | OUTPATIENT
Start: 2020-12-12 | End: 2020-12-14

## 2020-12-12 RX ORDER — BENZOCAINE 10 %
1 GEL (GRAM) MUCOUS MEMBRANE EVERY 6 HOURS
Refills: 0 | Status: DISCONTINUED | OUTPATIENT
Start: 2020-12-12 | End: 2020-12-14

## 2020-12-12 RX ORDER — OXYCODONE HYDROCHLORIDE 5 MG/1
5 TABLET ORAL
Refills: 0 | Status: DISCONTINUED | OUTPATIENT
Start: 2020-12-12 | End: 2020-12-14

## 2020-12-12 RX ORDER — KETOROLAC TROMETHAMINE 30 MG/ML
30 SYRINGE (ML) INJECTION ONCE
Refills: 0 | Status: DISCONTINUED | OUTPATIENT
Start: 2020-12-12 | End: 2020-12-14

## 2020-12-12 RX ORDER — IBUPROFEN 200 MG
600 TABLET ORAL EVERY 6 HOURS
Refills: 0 | Status: COMPLETED | OUTPATIENT
Start: 2020-12-12 | End: 2021-11-10

## 2020-12-12 RX ORDER — OXYCODONE HYDROCHLORIDE 5 MG/1
5 TABLET ORAL ONCE
Refills: 0 | Status: DISCONTINUED | OUTPATIENT
Start: 2020-12-12 | End: 2020-12-14

## 2020-12-12 RX ORDER — SIMETHICONE 80 MG/1
80 TABLET, CHEWABLE ORAL EVERY 4 HOURS
Refills: 0 | Status: DISCONTINUED | OUTPATIENT
Start: 2020-12-12 | End: 2020-12-14

## 2020-12-12 RX ORDER — IBUPROFEN 200 MG
600 TABLET ORAL EVERY 6 HOURS
Refills: 0 | Status: DISCONTINUED | OUTPATIENT
Start: 2020-12-12 | End: 2020-12-14

## 2020-12-12 RX ADMIN — Medication 975 MILLIGRAM(S): at 21:45

## 2020-12-12 RX ADMIN — Medication 600 MILLIGRAM(S): at 18:28

## 2020-12-12 RX ADMIN — Medication 975 MILLIGRAM(S): at 15:46

## 2020-12-12 RX ADMIN — Medication 975 MILLIGRAM(S): at 21:22

## 2020-12-12 RX ADMIN — Medication 30 MILLIGRAM(S): at 09:00

## 2020-12-12 RX ADMIN — Medication 30 MILLIGRAM(S): at 09:15

## 2020-12-13 LAB
HCT VFR BLD CALC: 33 % — LOW (ref 34.5–45)
HGB BLD-MCNC: 10.9 G/DL — LOW (ref 11.5–15.5)

## 2020-12-13 RX ADMIN — Medication 975 MILLIGRAM(S): at 15:28

## 2020-12-13 RX ADMIN — Medication 975 MILLIGRAM(S): at 22:13

## 2020-12-13 RX ADMIN — Medication 1 TABLET(S): at 12:55

## 2020-12-13 RX ADMIN — Medication 600 MILLIGRAM(S): at 13:00

## 2020-12-13 RX ADMIN — Medication 600 MILLIGRAM(S): at 12:55

## 2020-12-13 RX ADMIN — Medication 975 MILLIGRAM(S): at 23:00

## 2020-12-13 RX ADMIN — Medication 600 MILLIGRAM(S): at 00:15

## 2020-12-13 RX ADMIN — Medication 975 MILLIGRAM(S): at 08:57

## 2020-12-13 RX ADMIN — Medication 600 MILLIGRAM(S): at 18:47

## 2020-12-13 RX ADMIN — Medication 975 MILLIGRAM(S): at 15:00

## 2020-12-13 NOTE — PROGRESS NOTE ADULT - SUBJECTIVE AND OBJECTIVE BOX
S: Patient doing well. Minimal lochia. No complaints.   boy  O: Vital Signs Last 24 Hrs  T(C): 36.7 (13 Dec 2020 00:15), Max: 37.6 (12 Dec 2020 09:15)  T(F): 98.1 (13 Dec 2020 00:15), Max: 99.7 (12 Dec 2020 09:15)  HR: 66 (13 Dec 2020 00:15) (63 - 79)  BP: 93/52 (13 Dec 2020 00:15) (93/52 - 127/72)  BP(mean): --  RR: 16 (13 Dec 2020 00:15) (16 - 18)  SpO2: 96% (13 Dec 2020 00:15) (96% - 100%)    Gen: NAD    Abd: soft, NT, ND, fundus firm below umbilicus  Ext: no tenderness no edema    Labs:                        10.9   x     )-----------( x        ( 13 Dec 2020 07:32 )             33.0            A: 35y PPD# 2 s/p      Doing well    Plan:  ppd 1 stable  doing well  pumping  circumscion  advance care

## 2020-12-14 ENCOUNTER — TRANSCRIPTION ENCOUNTER (OUTPATIENT)
Age: 35
End: 2020-12-14

## 2020-12-14 VITALS
TEMPERATURE: 98 F | HEART RATE: 67 BPM | RESPIRATION RATE: 16 BRPM | DIASTOLIC BLOOD PRESSURE: 79 MMHG | OXYGEN SATURATION: 98 % | SYSTOLIC BLOOD PRESSURE: 124 MMHG

## 2020-12-14 RX ORDER — BENZOCAINE 10 %
1 GEL (GRAM) MUCOUS MEMBRANE
Qty: 0 | Refills: 0 | DISCHARGE
Start: 2020-12-14

## 2020-12-14 RX ORDER — LANOLIN
1 OINTMENT (GRAM) TOPICAL
Qty: 0 | Refills: 0 | DISCHARGE
Start: 2020-12-14

## 2020-12-14 RX ORDER — IBUPROFEN 200 MG
1 TABLET ORAL
Qty: 0 | Refills: 0 | DISCHARGE
Start: 2020-12-14

## 2020-12-14 RX ORDER — ACETAMINOPHEN 500 MG
3 TABLET ORAL
Qty: 0 | Refills: 0 | DISCHARGE
Start: 2020-12-14

## 2020-12-14 RX ORDER — DIBUCAINE 1 %
1 OINTMENT (GRAM) RECTAL
Qty: 0 | Refills: 0 | DISCHARGE
Start: 2020-12-14

## 2020-12-14 RX ADMIN — Medication 600 MILLIGRAM(S): at 00:16

## 2020-12-14 RX ADMIN — Medication 975 MILLIGRAM(S): at 15:28

## 2020-12-14 RX ADMIN — Medication 975 MILLIGRAM(S): at 09:33

## 2020-12-14 RX ADMIN — Medication 600 MILLIGRAM(S): at 07:56

## 2020-12-14 RX ADMIN — Medication 600 MILLIGRAM(S): at 01:00

## 2020-12-14 NOTE — DISCHARGE NOTE OB - MEDICATION SUMMARY - MEDICATIONS TO TAKE
I will START or STAY ON the medications listed below when I get home from the hospital:    acetaminophen 325 mg oral tablet  -- 3 tab(s) by mouth   -- Indication: For uterine cramping and perineal pain    ibuprofen 600 mg oral tablet  -- 1 tab(s) by mouth every 6 hours  -- Indication: For uterine cramping and perineal pain    benzocaine 20% topical spray  -- 1 spray(s) on skin every 6 hours, As needed, for Perineal discomfort  -- Indication: For  perineal pain    dibucaine 1% topical ointment  -- 1 application on skin every 6 hours, As needed, Perineal discomfort  -- Indication: For  perineal pain    lanolin topical ointment  -- 1 application on skin every 6 hours, As needed, nipple soreness  -- Indication: For nipple soreness    Prenatal Multivitamins with Folic Acid 1 mg oral tablet  -- 1 tab(s) by mouth once a day  -- Indication: For supplementation

## 2020-12-14 NOTE — DISCHARGE NOTE OB - CARE PLAN
Principal Discharge DX:	Group B streptococcal carriage complicating pregnancy  Goal:	healhty mother and son  Assessment and plan of treatment:	Resolved infection in mother, resolution of infection in son

## 2020-12-14 NOTE — DISCHARGE NOTE OB - IF YOU ARE A SMOKER, IT IS IMPORTANT FOR YOUR HEALTH TO STOP SMOKING. PLEASE BE AWARE THAT SECOND HAND SMOKE IS ALSO HARMFUL.
Continued Stay Review    Date: 1/3/20  Auth# [id number]      Gricelda Tim [phone], fax [phone]    Vital Signs: /76 (BP Location: Right arm)   Pulse 76   Temp 97 7 °F (36 5 °C) (Temporal)   Resp 18   Wt 64 3 kg (141 lb 12 1 oz)   SpO2 98%   BMI 24 33 kg/m²          Assessment/Plan:     Eneida Bergeron MD   Physician   Hospitalist   Progress Notes   Signed   Date of Service:  1/2/2020  1:34 PM               Signed                   Progress Note - Chris Sung 1932, 81 y o  female MRN: [id number]     Unit/Bed#: -01 Encounter: [id number]     Primary Care Provider: Allyssa Freeman DO   Date and time admitted to hospital: 12/27/2019 11:21 AM           * Thoracic vertebral collapse, initial encounter [facility])  Assessment & Plan  Patient was admitted for T2 compression fracture after unwitnessed fall at home  She is now aftercare following surgery of the nervous system  Lidoderm patch and gabapentin HS for pain ineffective  Tylenol to 975 Q 8  Family does not want her to be on any narcotics  T12 kyphoplasty with 2 ml of PMMA done by intervention Radiology on 12/26   Stable for now  Continue routine postop care and physical therapy and occupational therapy     Hypertensive heart disease without heart failure  Assessment & Plan  Patient has had multiple elevated blood pressure since admission to rehab however no history of elevated blood pressure in the past   Continue lisinopril and Norvasc as per holding parameters     Late onset Alzheimer's disease with behavioral disturbance ([address] )  Assessment & Plan  Continue home medication Namenda and Aricept and seroquel  Patient appears to be at her baseline  She has some periods of being a little aggressive as well    Continue dementia supportive treatment  Continue Seroquel daily at bedtime for behavior control  Continue Depakote for mood control     Primary insomnia  Assessment & Plan  Patient was on Seroquel and melatonin, will continue  Stable for now     Syncope and collapse  Assessment & Plan  Patient seen by Neurology at [facility] and there was concern for possible seizure disorder  She was placed on Depakote and her dose has been gradually increased from 125 t i d  To 250 t i d  Today  Will need outpatient follow-up with Neurology upon discharge     Chronic UTI  Assessment & Plan  Patient is on daily keflex  Family is concerned that her change in behavior and lethargy and worsening headache is secondary to another UT I  There has been no fevers chills reported  Will check a CBC a procalcitonin level and UA and will only treat a UTI if it is significantly positive  She is known to be a chronic colonizer as well of multidrug resistant bacteria     Chronic headache  Assessment & Plan  During her acute care admission, Neurology was consulted who started patient on Depakote on trial   As per neuro recommendations she is to have her Depakote dose gradually titrated out every 2 weeks  She was started on 125 mg t i d   Will increase to 250 mg t i d  Today  Recheck of Depakote level in 4-5 days  After 2 weeks she has to further have her dose titrated up to 375 mg t i d  Should have outpatient follow-up with Neurology scheduled prior to discharge     Paroxysmal atrial fibrillation [facility])  Assessment & Plan  Patient is anticoagulated on Pradaxa  Heart rates are well controlled and she is on no rate control medications     Mixed hyperlipidemia  Assessment & Plan  Continue statin        VTE Pharmacologic Prophylaxis:   Pharmacologic: Dabigatran (Pradaxa)  Mechanical VTE Prophylaxis in Place: no     Patient Centered Rounds: I have performed bedside rounds with nursing staff today      Discussions with Specialists or Other Care Team Provider:  None     Education and Discussions with Family / Patient:  Discussed with patient's daughter at bedside     Time Spent for Care: 30 minutes    More than 50% of total time spent on counseling and coordination of care as described above      Current Length of Stay: 6 day(s)     Current Patient Status: SNF Short Term Inpatient      Discharge Plan:  Pending progress     Code Status: Level 3 - DNAR and DNI        Subjective:   As per the patient daughter she is having increased agitation and change in mental status and she is very concerned that she might have a UTI again  Medications:   Scheduled Meds:   Current Facility-Administered Medications:  acetaminophen 650 mg Oral Q12H PRN Marisabel Trevino MD   acetaminophen 975 mg Oral Q8H Boni Mahan MD   amLODIPine 5 mg Oral BID Vasiliy Jones MD   atorvastatin 20 mg Oral Daily With Roman Vazquez MD   calcium carbonate-vitamin D 1 tablet Oral BID With Meals Vasiliy Jones MD   cephalexin 250 mg Oral Daily Marisabel Trevino MD   dabigatran etexilate 150 mg Oral Q12H Boni Mahan MD   divalproex sodium 250 mg Oral TID Vasiliy Jones MD   docusate sodium 100 mg Oral BID PRN Padmini Stallworth MD   donepezil 10 mg Oral HS Marisabel Trevino MD   gabapentin 100 mg Oral HS Marisabel Trevino MD   lidocaine 1 patch Topical Daily Marisabel Trevino MD   lisinopril 10 mg Oral Daily Vasiliy Jones MD   melatonin 6 mg Oral HS Marisabel Trevino MD   memantine 10 mg Oral BID Marisabel Trevino MD   oxyCODONE 2 5 mg Oral Q6H PRN Vasiliy Jones MD   pantoprazole 40 mg Oral Daily Before Daniel Knott MD   polyethylene glycol 17 g Oral Daily PRN Marisabel Trevino MD   QUEtiapine 25 mg Oral HS Marisabel Trevino MD   saccharomyces boulardii 250 mg Oral BID Marisabel Trevino MD           PT NOTES AND DC SUMMARY:    Zak Davila PT   Physical Therapist   Physical Therapy   Physical Therapy Note   Addendum   Date of Service:  1/3/2020  8:05 AM                    Physical Therapy Daily Treatment Note and Discharge Summary          01/03/20:24 Minutes Total Time (8:05 to 8:29)     19 minutes Co-treatment ( 8:05 to 8:24) ->Co-treatment with OT completed   Pt highly limited by pain and decreased motivation to participate in rehab,  thus impacting overall engagement in functional activity  Co-treatment aimed to maximize activity tolerance and maintain pt safety with onset of pain; both PT and OT goals separately addressed throughout session      5 minutes Individual Treatment ( 8:24 to 8:29)      Pain Assessment   Pain Assessment Hsieh-Sanchez FACES   Pain Score    (When asked for pain level, pt stated ' I'm tired and dizzy")   Hsieh-Sanchez FACES Pain Rating 4   Pain Location Head;Back   Pain Descriptors Patient unable to describe   Pain Frequency Constant/continuous  (Pt with hx of chronic low back pain)   Pain Onset Ongoing   Clinical Progression Not changed   Effect of Pain on Daily Activities    (Limits participation and mobility)   Restrictions/Precautions   Weight Bearing Precautions Per Order No   Braces or Orthoses    (Abdominal binder for comforrt)   Other Precautions Contact/isolation; Chair Alarm; Bed Alarm;Cognitive; Fall Risk;Pain;Hard of hearing;Visual impairment;Spinal precautions; Impulsive   General   Family/Caregiver Present No   Cognition   Overall Cognitive Status Impaired   Arousal/Participation Cooperative   Attention Difficulty attending to directions   Orientation Level Oriented to person;Disoriented to place; Disoriented to time;Disoriented to situation  (Unable to recall age and birthdate/year)   Memory Decreased long term memory;Decreased recall of biographical information;Decreased short term memory;Decreased recall of recent events;Decreased recall of precautions   Following Commands Follows one step commands inconsistently   Comments    (Impaired safety, decreased insight/judgement into deficits)   Bed Mobility   Rolling R 5  Supervision  (HOB flat, +rail)   Additional items Increased time required;Verbal cues   Rolling L 5  Supervision  (HOB flat, +rail)   Additional items Increased time required;Verbal cues   Supine to Sit 5  Supervision  (HOB elevated, no rail)   Additional items Increased time required  (+ log roll)   Sit to Supine 5  Supervision  (HOB lfat, no rail)   Additional items Increased time required  (Crawled into bed -> Pt performed quadruped to R sidelying)   Additional Comments    (Increased effort with all aspects of bed mobility)   Transfers   Sit to Stand    (CG A)   Additional items Increased time required  (Good hand placement)   Stand to Sit    (CG A)   Additional items Increased time required;Verbal cues  (Good hand placement, assisted to control descent)   Stand pivot    (SPT with CG HHA ( hand held assistance))   Additional items Increased time required;Verbal cues   Toilet transfer    (Sit <->stand CG A; SPT with CG HHA )   Additional items Increased time required;Standard toilet  (Cues to completely back up to toilet)   Car transfer Unable to assess  (Pt declined to get back out of bed)   Additional Comments Refused to use RW for transfers  (Transfers: EOB, toilet, recliner chair)   Ambulation/Elevation   Gait pattern Decreased foot clearance;Narrow LESLY; Antalgic; Improper Weight shift; Short stride; Excessively slow   Gait Assistance    (CG A)   Additional items    (Refused to use RW for ambulation)   Assistive Device    (HHA ( hand held assistance))   Distance 20 feet, 60 feet  (Negotiated 4 turns during 60 feet amb trial)   Stair Management Assistance    (CG A)   Additional items Tactile cues; Verbal cues   Stair Management Technique Step to pattern; Foreward; No rails  (Hand held assistance and 1 hand on door frame)   Number of Stairs 2   Curbs 1 curb step without rails and CG HHA   Balance   Static Sitting Fair   Dynamic Sitting Fair   Static Standing Fair  (Supported )   Dynamic Standing    (Fair (-) supported with hand held assistance)   Ambulatory    (Fair (-) supported with hand held assistance)   Higher level balance Side stepping   Higher level balance rep range to the L and to the R  (Fair (-) supported with hand held assistance)   Endurance Deficit   Endurance Deficit Yes Endurance Deficit Description Post amb and transfer into bed: /62, HR 76  (Decreased endurance for activity)   Activity Tolerance   Activity Tolerance Patient limited by pain  (Patient limited by severely impaired cognitive status)   Assessment   Prognosis Fair   Problem List Decreased strength;Decreased range of motion;Decreased endurance; Impaired balance;Decreased mobility; Decreased coordination;Decreased cognition; Impaired judgement;Decreased safety awareness; Impaired hearing; Impaired vision;Pain   Assessment Pt is discharged from skilled PT effective today, 01/03/20  Pt remains on TCF Unit with plans to return to home with family today, if medically cleared  Family will be available to provide 24/7 Supervision and assist as needed  Since 12/28/19 PT Eval, pt was seen for 3 skilled PT tx sessions for bed mobility, theract, and gait/elevation training  Despite ongoing pain issues, severely impaired cognition, and very low motivation; pt with good progress during today's skilled PT tx session  Improvement assessed with: functional strength, balance, activity tolerance,  bed mobility, transfers, and gait/elevations  Please grid below and flowsheet, for details on pt's functional mobility status at discharge  Barriers to Discharge Inaccessible home environment  (Below functioanl baseline, impaired cognition)   Goals   Patient Goals    (" I want to get out of here and go home")   STG Expiration Date 01/11/20   Short Term Goal #1 See grid   PT Treatment Day 3   Plan   Treatment/Interventions ADL retraining;Functional transfer training;LE strengthening/ROM; Elevations; Therapeutic exercise; Endurance training;Cognitive reorientation;Patient/family training;Equipment eval/education; Bed mobility;Gait training; Compensatory technique education;Continued evaluation;Spoke to MD;Spoke to case management;Spoke to nursing;Spoke to advanced practitioner;OT;Family   PT Frequency    (5 to 7x/week)   Recommendation Recommendation 24 hour supervision/assist;D/C PT;Home with family support;Home PT   Equipment Recommended    (Hospital bed, BSC, shower chair with back)   PT - OK to Discharge Yes  (When medically cleared)   Barthel Index   Feeding 5   Bathing 0   Grooming Score 5  (Per conversation with OT [facility]))   Dressing Score 5   Bladder Score 5   Bowels Score 10   Toilet Use Score 5   Mobility (Level Surface) Score 0   Stairs Score 5  (On 2 steps without rails)         Goals STG achieved within 2 weeks Performance at Initial Evaluation (12/28/19) Current Performance (last date completed)      Bed mobility skills including rolling and repositioning to prevent skin breakdown and decrease caregiver burden            Mod I     NOT ACHIEVED       Min A       01/03/20      Supine to sit transitions to increase ease of transfer, allow pt to get out of bed, and decrease caregiver burden          Mod I     NOT ACHIEVED       Min A       01/03/20      Sit to supine transitions to increase ease of transfer, allow pt to get back into bed, and decrease caregiver burden          Mod I     NOT ACHIEVED       Min A       01/03/20      Functional transfers to facilitate safe return to previous living environment           Supervision     NOT ACHIEVED       Min A w/ RW       01/03/20      Ambulation with least restrictive AD; including at least 2 turns for safe household distance ambulation          Supervision     NOT ACHIEVED       Min A w/ RW 30ft       01/03/20      Ascend/descend 3 steps, using appropriate AD and method, no handrails, for safe access to previous living environment          Supervision     NOT ACHIEVED       Not attempted 2* to increased LBP          01/03/20                      Revision History                                    OT NOTES       Subjective/Goals: "where did my family go, they left without me"     Vitals: none taken- patient agitated     Pain: reports pain to back- not able to quantify        Treatment Time: (914-825) 10 minutes + (8038-9144) 23 min ADL= 33 min total treatment over 2 sessions        Cognition: impaired     Precautions: TLSO brace, WBAT, Contact/isolation, impulsive, Cognitive, Chair/bed alarms, WBS, Seizure, Pain, Spinal precautions, visual impairment     EVALUATION information:  · OT Goal expiration date: 1/10/20  · Treatment day: 5  · Discharge recommendation: Home OT with 24* supervision   · HOME SET UP:  ? House- previously living in in law suite- plans to live in home with family upon d/c   ? One level home with 2 CLINTON and no HR's  ? Walk in shower (3-4" threshold)  ? Standard bathroom  ? Assist from family  ? As per dtr report, pta pt ind with ADLs and ambulation, family provides close to 24/7 supervision, cameras for safety  Family completes all IADLs  (-) drives  Plans to d/c to dtr [facility] where 24/7 will be provided  Pt unaware, will further discuss when appropriate, dtr reports d/c information will cause confusion  No prior DME/AE  ? Per daughter vision loss in left eye-- patient unaware and compensates  ? Compression fx T12 post syncope episode     Patient education: SPINAL PRECAUTIONS, SLOWING OF PACE, ENERGY CONSERVATION TECHNIQUES FOR CARRY OVER UPON D/C, INCREASED FAMILY SUPPORT, CONTINUE PARTICIPATION IN SELF-CARE/MOBILITY WITH STAFF WHILE IN [facility] , OVERALL SAFETY AWARENESS, BACK PRECAUTIONS, FALL PREVENTION, ENERGY CONSERVATION  and PAIN MANAGEMENT WITH FUNCTIONAL ACTIVITIES     Additional comments:  ([phone]) 10 minutes:  Patient in [place] this AM upon arrival requesting to get dressed  MCDONALD walked patient into her room with Close supervision and hand assist PRN where she sat on edge of bed and MCDONALD obrained needed items  Once water was brought to patient and she realized that clean PJ's was all she had for the day she laid self back into bed and refused AM care  Daughter at this time not present    MCDONALD attempted to encourage patient to complete care however unsuccessful as patient indicated she had PJ's on and no need to get changed stating she wanted pants (aka- street clothes) "i'm going home, why did they leave without me"  Further ed provided however patient remains in bed, alarm attached  Patient later walked back to [facility] to await daughter's arrival       ([id number]) 23 min ADL:  Session focused on eating (Setup), oral hygiene (S), toileting (S), bathing (S), UB dressing (S), LB dressing (S), and don/doff footwear (S)  Overall supervision with MAX cues for completion and overall safety  Patient with decreased insight into deficits-- daughter present for sponge bath ADL     Assessment:  Patient seen this date for OT with focus on goals as set by [address] [place] [address] agreeable to skilled OT session with focus on ADL's (bathing, dressing, toileting), Transfers (STS, SPT), Standing tolerance/balance, Strength/ROM/HEP, Tub/shower transfers, Cognition, Activity tolerance and Bathroom/kitchen/home mobility    Barriers to treatment include fatigue, pain, volition, cognition, safety, Dizziness, decreased strength/coordination, activity tolerance and standing tolerance  Patient educated on safe functional transfers techniques, the appropriate use of AE/DME to improve functional performance, and activity modification techniques for energy conservation   Plans for d/c are home with 24* supervision   Patient is making gains toward OT goals with continued OT recommended at this time to max tolerance, safety and function for appropriate d/c planning   At end of session patient remains in bed with all needs within reach         Goals STG achieved within 2 weeks Performance at Initial Evaluation 12/27/2019  Current Performance (last date completed)   Grooming while standing at sink S  (12/30) (12/31) (1/1) Min A 1/1, 12/31 Supervision for oral care  12/30 Supervision + cues for handwashing (cues to remember to use soap)  12/28 REFUSED sinkside getting very aggitiated therefore set up seated for oral care completing with MI upon set up  12/27 Min A    ADL transfers  S  (12/30) (12/31) (1/1) Min A 1/1, 12/31, 12/30 STS MI, Supervision SPT (VERY IMPULSIVE- DECREASED SAFETY AWARENESS)  12/28-- VERY impulsive   STS MI (poor safety awareness); CGA/CS SPT and mobility with RW (patient appeared to transfer best in bathroom without AD)  12/27 Min A impulsive    Bathroom mobility with appropriate AD S  (12/30) (12/31) (1/1)  Unable to assess 1/1, 12/31 Supervision + RW recommended-- Patient walked to/from shower room with hand held assistance (x 1-2) (x2 with fatigue/pain post shower)   12/30 NEEDS CUES TO STAY WITH WALKER (WILL PUSH AWAY WHEN APPROACHING SURFACES)-- overall supervision needed   Was walking in room without AD earlier in AM when MCDONALD answered chair alarm (decreased insight)   12/28 CS/CGA + RW and short distance without as she pushed walker away quickly in frustration    Don/Doff TLSO  Min A   1/1 Abd binder provided-- continues to refuse use  Daughter feels that when at home and she is not allowed to be in bed all day she will be more receptive to binder use      12/31 refuses  Rivas Chambers a abd binder per daughter request-- dependent don  12/30 refuses to wear-- daughter requesting abd binder for support as TLSO brace will "just be a fight for her"  12/28 REFUSED-- daughter states brace not effective   Requesting different brace stating present brace only further adds to frustration     12/27 Pt declined brace-- impulsive into txfr without TLSO    UB ADL  S  (12/28) (1/1) Min A 1/1 Supervision + cues and encouragement due to resistance (responded to daughters cues to listen to therapy)  12/31 daughter assisted patient without attempt to have patient do on own  12/28 MI upon set up, cues and MAX encouragement   12/27 Min A    LB ADL, AE PRN S  (12/28) (1/1) Mod A 1/1 Supervision + v/c tailor sit for LE management   12/31 daughter assisted without attempt to have patient complete-- daughter reports easier to do then fight with patient    12/28 Doff mod to encourage participation due to resistance    Washed with Supervision + v/c and dressed with supervision + cues (tailor sitting)  12/27 Mod A   Toileting/clothing management and hygiene S  (12/30) (12/31) (1/1) Mod A 1/1, 12/31, 12/30 CM- S/MI, Hygiene S/MI (Supervision for safety and sequencing)   Patient reports beng aware of loose stool and incontinence    12/28 CGA/CS  12/27 Mod A   Dynamic standing balance for increased safety when completing purposeful tasks F/F+  (12/30) (1/1) P+ 1/1 Fair/fair+  12/31 Fair  12/30 Fair/Fair+ for toileting/transfers   12/28 Fair  12/27 P+   Increase standing tolerance for inc'd safety with standing purposeful tasks 4-7 min <1 min  1/1 self limits stance and mobility-- 1-2 minutes due to patient volition  12/31 3-4 min with shower ADL/mobility (increased fatigue overall post shower)  12/30 3 minutes (self limits with confustion and difficulty getting patient to attend to functional tasks)  12/28 2-3 min with ADL/mobility  12/27 <1 min   Participate in therex 1-3x/week for inc'd overall stamina/activity tolerance for purposeful tasks To be completed    1/1 refuses but tolerates ADL functioning with cues   12/30 unable to get patient to cooperate early AM prior to daughter arrival  Lonn Vikki PM daughter feels only exercise patient may get upon d/c will be to/from bathroom frequently throughout day  12/28 ADL only  12/27 Resistive to MMT   shower stall transfer (NO DME) S  (1/1- simulated) Unable to assess 1/1 Supervision   12/31 handheld assist (daughter planning to get a shower chair for home)  12/30 unable to assess (left bathroom post toileting and headed to bed)  12/28 CGA/CS + MAX encouragement    Bed mobility with HOB flat  S  (12/28) (12/30)   (12/31) (1/1) Min A  1/1, 12/31 [facility]  12/30 supervision (poor tech for back protection, unable to get patient to correct   Patient almost climbs into bed and can fix self upon supine   Daughter reports her method to be the same she would always complete PTA)  12/28 Supervision (unable to ed on spinal precautions)  12/27 Sup<>sit S  Rolling S       Humberto Lemus  1/1/2020          Age/Sex: 81 y o  female       Discharge Plan:     Plan is for patient to discharge home TODAY  Daughter to transport home  Daughter will provide 24/7 supervision   DON reviewed medications and questions   No further questions/concerns at this time Statement Selected

## 2020-12-14 NOTE — DISCHARGE NOTE OB - CARE PROVIDER_API CALL
JAMES BENAVIDES  OBSTETRICS AND GYNECOLOGY  [address], [address]  [place] [address]  Phone: [phone]  Fax: [phone]  Follow Up Time:

## 2020-12-14 NOTE — DISCHARGE NOTE OB - PATIENT PORTAL LINK FT
19 You can access the FollowMyHealth Patient Portal offered by [facility] by registering at the following website: [email or web address]. By joining [employer]’s FollowMyHealth portal, you will also be able to view your health information using other applications (apps) compatible with our system.

## 2020-12-14 NOTE — DISCHARGE NOTE OB - HOSPITAL COURSE
Admitted with SROM at 34 weeks. Terese had Betamethasone on  and  during prior admission.  on 20 of 3# male, breathing well at birth then required CPAP shortly. Mother with normal return of bowel and bladder and nromal H/H  with  ml. Parents home PPD 2.  Baby will stay and circumcision consented and planned prior to discharge.

## 2020-12-14 NOTE — PROGRESS NOTE ADULT - SUBJECTIVE AND OBJECTIVE BOX
S: Patient doing well. Minimal lochia and decreasing. No complaints.   Son in special care taking breastmilk slowly. BM and flatus.     O: Vital Signs Last 24 Hrs  T(C): 36.7 (13 Dec 2020 20:15), Max: 36.8 (13 Dec 2020 12:00)  T(F): 98.1 (13 Dec 2020 20:15), Max: 98.2 (13 Dec 2020 12:00)  HR: 77 (13 Dec 2020 20:15) (64 - 77)  BP: 102/64 (13 Dec 2020 20:15) (102/64 - 109/72)  BP(mean): --  RR: 16 (13 Dec 2020 20:15) (16 - 18)  SpO2: 98% (13 Dec 2020 20:15) (97% - 98%)    Gen: NAD  Abd: soft, NT, ND, fundus firm below umbilicus  Ext: no tenderness    Labs:                        10.9   x     )-----------( x        ( 13 Dec 2020 07:32 )             33.0        Rubella status:  IMMUNE    A: 35y PPD# 2 s/p      Doing well    Plan:  [ ] Discharge home.  Nothing in vagina and no heavy lifting for 6 weeks.   Follow up 6 weeks for post partum visit.  Call office for any fevers, chills, heavy vaginal bleeding, symptoms of depression, or any other concerning symptoms.  Continue motrin 600 mg every 6 hours.  Baby in special care staying. Circumcision consent obtained.

## 2020-12-15 ENCOUNTER — APPOINTMENT (OUTPATIENT)
Dept: ANTEPARTUM | Facility: CLINIC | Age: 35
End: 2020-12-15

## 2020-12-16 ENCOUNTER — APPOINTMENT (OUTPATIENT)
Dept: OBGYN | Facility: CLINIC | Age: 35
End: 2020-12-16

## 2020-12-16 DIAGNOSIS — O46.93 ANTEPARTUM HEMORRHAGE, UNSPECIFIED, THIRD TRIMESTER: ICD-10-CM

## 2020-12-16 DIAGNOSIS — Z3A.34 34 WEEKS GESTATION OF PREGNANCY: ICD-10-CM

## 2020-12-16 DIAGNOSIS — Z28.09 IMMUNIZATION NOT CARRIED OUT BECAUSE OF OTHER CONTRAINDICATION: ICD-10-CM

## 2020-12-16 DIAGNOSIS — Z79.899 OTHER LONG TERM (CURRENT) DRUG THERAPY: ICD-10-CM

## 2021-01-06 ENCOUNTER — APPOINTMENT (OUTPATIENT)
Dept: OBGYN | Facility: CLINIC | Age: 36
End: 2021-01-06
Payer: COMMERCIAL

## 2021-01-06 VITALS
SYSTOLIC BLOOD PRESSURE: 110 MMHG | BODY MASS INDEX: 23.74 KG/M2 | TEMPERATURE: 97.7 F | WEIGHT: 129 LBS | DIASTOLIC BLOOD PRESSURE: 64 MMHG | HEIGHT: 62 IN

## 2021-01-06 PROCEDURE — 0503F POSTPARTUM CARE VISIT: CPT

## 2021-01-06 NOTE — HISTORY OF PRESENT ILLNESS
[Postpartum Follow Up] : postpartum follow up [Complications:___] : complications include: [unfilled] [Delivery Date: ___] : on [unfilled] [] : delivered by vaginal delivery [Male] : Delivery History: baby boy [Wt. ___] : weighing [unfilled] [NICU: ___] : NICU: [unfilled] [Breastfeeding] : currently nursing [BF with Difficulty] : nursing with difficulty [Breast Pain] : no breast pain [BreastFeeding Problems] : breastfeeding problems [Chills] : no chills [Dysuria] : no dysuria [Fever] : no fever [None] : no vaginal bleeding [Healing Well] : is healing well [Infected] : did not appear infected [Examination Of The Breasts] : breasts are normal [Doing Well] : is doing well [FreeTextEntry9] : right breast tenderness and chills 2 days ago treated with cephalexin 250 mg QID. [de-identified] : Baby falls asleep instead of latching. Feeling pressure vaginally "like a retained tampon" [de-identified] : RML tear is healing but granulation tissue noted at perineum. Right breast does not show fluctuance, tenderness nor hot swelling.  [de-identified] : Right mastitis improved. Nipples slightly pink, so continue probiotic. Taught to look for signs of monilia infection in son. Suggest lactation consultant for help with latch.  [de-identified] : RTO 3 weeks for repeat breast exam and inspection of vaginal healing. May need silver nitrate.

## 2021-01-27 ENCOUNTER — APPOINTMENT (OUTPATIENT)
Dept: OBGYN | Facility: CLINIC | Age: 36
End: 2021-01-27
Payer: COMMERCIAL

## 2021-01-27 VITALS
WEIGHT: 128 LBS | HEIGHT: 62 IN | SYSTOLIC BLOOD PRESSURE: 110 MMHG | DIASTOLIC BLOOD PRESSURE: 64 MMHG | BODY MASS INDEX: 23.55 KG/M2

## 2021-01-27 PROCEDURE — 0503F POSTPARTUM CARE VISIT: CPT

## 2021-01-27 NOTE — HISTORY OF PRESENT ILLNESS
[Delivery Date: ___] : on [unfilled] [Male] : Delivery History: baby boy [] : delivered by vaginal delivery [Breastfeeding] : currently nursing [BF with Difficulty] : nursing without difficulty [Breast Pain] : no breast pain [BreastFeeding Problems] : breastfeeding problems [S/Sx PP Depression] : no signs/symptoms of postpartum depression [Incisional Pain] : incisional pain [None] : no vaginal bleeding [Examination Of The Breasts] : breasts are normal [Doing Well] : is doing well [No Sign of Infection] : is showing no signs of infection [de-identified] : granulation tissue at introitus of 2 x 3 mm. Silver nitrate applied. Tolerated well.  [de-identified] : Condom use desired.  [Postpartum Follow Up] : postpartum follow up [Complications:___] : no complications [Last Pap Date: ___] : Last Pap Date: [unfilled] [FreeTextEntry9] : PPROM 34 wk  male

## 2021-02-04 ENCOUNTER — APPOINTMENT (OUTPATIENT)
Dept: OBGYN | Facility: CLINIC | Age: 36
End: 2021-02-04
Payer: COMMERCIAL

## 2021-02-04 ENCOUNTER — NON-APPOINTMENT (OUTPATIENT)
Age: 36
End: 2021-02-04

## 2021-02-04 VITALS
SYSTOLIC BLOOD PRESSURE: 120 MMHG | BODY MASS INDEX: 23.37 KG/M2 | DIASTOLIC BLOOD PRESSURE: 80 MMHG | HEIGHT: 62 IN | WEIGHT: 127 LBS

## 2021-02-04 PROCEDURE — 99212 OFFICE O/P EST SF 10 MIN: CPT

## 2021-02-04 PROCEDURE — 99072 ADDL SUPL MATRL&STAF TM PHE: CPT

## 2021-02-04 NOTE — HISTORY OF PRESENT ILLNESS
[FreeTextEntry1] : 35 yo  with  20 at 34 wks returns with left breast pain.\par \par January mastitis treatment with Keflex.  visit last week post partum showed resolution and normal exam.\par This week, baby had breastfeeding jaundice with bilirubin level of 10. She was advised by Dr Pulido, peds to provide formula and pumping. \par \par Last night, she pumped at 3:00 am.  She was woken by sharp stabbling left breast pain which lasted few minutes. She finally desmond to pump at 6:00 with relief.  No fever, chills, n/v.  Son thriving.  No sign of yeast infection.

## 2021-02-04 NOTE — DISCUSSION/SUMMARY
[FreeTextEntry1] : no sign of mastitis nor abscess\par in fact, mastitis was on right and pain is on left\par No sign of yeast infection of nipple.\par Referral to Yashira Posada, ped, lact consultatnt\par Can also ask HH lac consultant for advice.\par US left breast now to confirm no pathology.  NO FH of breast cancer.\par Reassurance provided

## 2021-02-04 NOTE — PHYSICAL EXAM
[FreeTextEntry6] : No cracking nor redness of nipple. No hot nor tender areas. Normal exam b/l. NO adenopathy.  [Examination Of The Breasts] : a normal appearance

## 2021-02-05 ENCOUNTER — APPOINTMENT (OUTPATIENT)
Dept: ULTRASOUND IMAGING | Facility: CLINIC | Age: 36
End: 2021-02-05
Payer: COMMERCIAL

## 2021-02-05 ENCOUNTER — OUTPATIENT (OUTPATIENT)
Dept: OUTPATIENT SERVICES | Facility: HOSPITAL | Age: 36
LOS: 1 days | End: 2021-02-05
Payer: COMMERCIAL

## 2021-02-05 ENCOUNTER — RESULT REVIEW (OUTPATIENT)
Age: 36
End: 2021-02-05

## 2021-02-05 DIAGNOSIS — N64.4 MASTODYNIA: ICD-10-CM

## 2021-02-05 PROCEDURE — 76641 ULTRASOUND BREAST COMPLETE: CPT | Mod: 26,LT

## 2021-02-05 PROCEDURE — 76641 ULTRASOUND BREAST COMPLETE: CPT

## 2021-02-10 ENCOUNTER — TRANSCRIPTION ENCOUNTER (OUTPATIENT)
Age: 36
End: 2021-02-10

## 2021-02-11 ENCOUNTER — TRANSCRIPTION ENCOUNTER (OUTPATIENT)
Age: 36
End: 2021-02-11

## 2021-02-22 ENCOUNTER — NON-APPOINTMENT (OUTPATIENT)
Age: 36
End: 2021-02-22

## 2021-02-24 ENCOUNTER — APPOINTMENT (OUTPATIENT)
Dept: OBGYN | Facility: CLINIC | Age: 36
End: 2021-02-24
Payer: COMMERCIAL

## 2021-02-24 VITALS
BODY MASS INDEX: 23 KG/M2 | DIASTOLIC BLOOD PRESSURE: 64 MMHG | RESPIRATION RATE: 16 BRPM | WEIGHT: 125 LBS | HEIGHT: 62 IN | SYSTOLIC BLOOD PRESSURE: 114 MMHG

## 2021-02-24 DIAGNOSIS — O60.03 PRETERM LABOR W/OUT DELIVERY, THIRD TRIMESTER: ICD-10-CM

## 2021-02-24 DIAGNOSIS — Z34.92 ENCOUNTER FOR SUPERVISION OF NORMAL PREGNANCY, UNSPECIFIED, SECOND TRIMESTER: ICD-10-CM

## 2021-02-24 DIAGNOSIS — O23.41 UNSPECIFIED INFECTION OF URINARY TRACT IN PREGNANCY, FIRST TRIMESTER: ICD-10-CM

## 2021-02-24 DIAGNOSIS — B95.1 UNSPECIFIED INFECTION OF URINARY TRACT IN PREGNANCY, FIRST TRIMESTER: ICD-10-CM

## 2021-02-24 DIAGNOSIS — O99.810 ABNORMAL GLUCOSE COMPLICATING PREGNANCY: ICD-10-CM

## 2021-02-24 PROCEDURE — 99072 ADDL SUPL MATRL&STAF TM PHE: CPT

## 2021-02-24 PROCEDURE — 99213 OFFICE O/P EST LOW 20 MIN: CPT

## 2021-02-24 NOTE — DISCUSSION/SUMMARY
[FreeTextEntry1] : Left breast cyst:\par -no signs of abscess today\par -no need for antibiotics\par -would have sent US breast, but pt seeing Dr Adam in 5 days.\par -possibly overstimulation to left breast.  described techniques to support baby feeds, less pumping.\par -Terese using warm compresses prior to pumping.\par -suggest not trying manual expression as this may be less effective, more painful and stimulating.\par -limit pumping time if son can feed well. (he is 10 week, but adjusted age if 4 weeks)\par \par Pelvic floor dysfunction:\par -described healing process\par -lubrication may be needed to resume intercourse\par -[facility] referral made.

## 2021-02-24 NOTE — PHYSICAL EXAM
[FreeTextEntry6] : Right normal empty breast. Left side had 2.5 cm firm round area at 12:00. Nipples pink but intact. No erythema, streaking nor enlarged lymph nodes.

## 2021-02-24 NOTE — HISTORY OF PRESENT ILLNESS
[FreeTextEntry1] : 37 yo  with  on 20 of 34 wk son returns with pain in left breast.\par \par Recall 2/ follow up following treatment for mastitis with Keflex.\par As symptoms returned to normal, no follow up was needed. The US breast showed 10:00 small cyst on left.\par Now pain is above nipple.\par \par Terese has been seeing lactation consultant and Dr Pulido for advice. She pumped in the beginning to stimulate milk supply when he was maturing. He now takes from the breast. She has additional supply stored.\par \par She is tired from the double work. \par \par Vaginally she feels pressure. No signs of UTI noted. Small tear well healed at last visit. Recall UC is treated with mesalamine rectal suppositories. No bleeding.

## 2021-03-01 ENCOUNTER — APPOINTMENT (OUTPATIENT)
Dept: BREAST CENTER | Facility: CLINIC | Age: 36
End: 2021-03-01
Payer: COMMERCIAL

## 2021-03-01 VITALS
SYSTOLIC BLOOD PRESSURE: 121 MMHG | HEIGHT: 62 IN | BODY MASS INDEX: 23 KG/M2 | DIASTOLIC BLOOD PRESSURE: 82 MMHG | HEART RATE: 87 BPM | WEIGHT: 125 LBS

## 2021-03-01 DIAGNOSIS — Z80.3 FAMILY HISTORY OF MALIGNANT NEOPLASM OF BREAST: ICD-10-CM

## 2021-03-01 PROCEDURE — 99072 ADDL SUPL MATRL&STAF TM PHE: CPT

## 2021-03-01 PROCEDURE — 99204 OFFICE O/P NEW MOD 45 MIN: CPT

## 2021-03-01 RX ORDER — AMOXICILLIN 500 MG/1
500 CAPSULE ORAL 3 TIMES DAILY
Qty: 15 | Refills: 0 | Status: DISCONTINUED | COMMUNITY
Start: 2020-12-08 | End: 2021-03-01

## 2021-03-01 RX ORDER — CEPHALEXIN 250 MG/1
250 CAPSULE ORAL 4 TIMES DAILY
Qty: 28 | Refills: 0 | Status: DISCONTINUED | COMMUNITY
Start: 2021-01-05 | End: 2021-03-01

## 2021-03-01 RX ORDER — MESALAMINE 1000 MG/1
1000 SUPPOSITORY RECTAL
Qty: 30 | Refills: 0 | Status: DISCONTINUED | COMMUNITY
Start: 2020-10-15 | End: 2021-03-01

## 2021-03-02 ENCOUNTER — RESULT REVIEW (OUTPATIENT)
Age: 36
End: 2021-03-02

## 2021-03-02 ENCOUNTER — OUTPATIENT (OUTPATIENT)
Dept: OUTPATIENT SERVICES | Facility: HOSPITAL | Age: 36
LOS: 1 days | End: 2021-03-02
Payer: COMMERCIAL

## 2021-03-02 ENCOUNTER — APPOINTMENT (OUTPATIENT)
Dept: ULTRASOUND IMAGING | Facility: CLINIC | Age: 36
End: 2021-03-02
Payer: COMMERCIAL

## 2021-03-02 DIAGNOSIS — N63.20 UNSPECIFIED LUMP IN THE LEFT BREAST, UNSPECIFIED QUADRANT: ICD-10-CM

## 2021-03-02 PROCEDURE — 76642 ULTRASOUND BREAST LIMITED: CPT | Mod: 26,LT

## 2021-03-02 PROCEDURE — 76642 ULTRASOUND BREAST LIMITED: CPT

## 2021-03-02 RX ORDER — BISMUTH SUBSALICYLATE 525 MG/1
TABLET ORAL
Refills: 0 | Status: ACTIVE | COMMUNITY

## 2021-03-02 NOTE — HISTORY OF PRESENT ILLNESS
[FreeTextEntry1] : Ms. Sun is a 26 year old woman who presents for a consultation for a left breast lump. She has been breastfeeding for 11 weeks. 3 weeks ago, she had shooting pains in the left breast that went away after 2 nights. A week and a half ago, she noticed a lump in the left superior breast with an engorged feeling and tenderness. Slight decrease after breastfeeding. Warm compress, massage, sunflower, lecithin have been without effect. She alternates between breastfeeding and pumping as her baby has reflux and drinks from a bottle with cereal mixed into the milk. \par \par Her family history is significant for breast cancer in her mother's cousin at 55.

## 2021-03-02 NOTE — PHYSICAL EXAM
[Normocephalic] : normocephalic [Atraumatic] : atraumatic [EOMI] : extra ocular movement intact [Sclera nonicteric] : sclera nonicteric [Supple] : supple [No Supraclavicular Adenopathy] : no supraclavicular adenopathy [No Cervical Adenopathy] : no cervical adenopathy [Clear to Auscultation Bilat] : clear to auscultation bilaterally [Normal Sinus Rhythm] : normal sinus rhythm [No Rubs] : no pericardial rub [Examined in the supine and seated position] : examined in the supine and seated position [Asymmetrical] : asymmetrical [No dominant masses] : no dominant masses in right breast  [No Nipple Retraction] : no left nipple retraction [No Nipple Discharge] : no left nipple discharge [No Axillary Lymphadenopathy] : no left axillary lymphadenopathy [Soft] : abdomen soft [Not Tender] : non-tender [No Edema] : no edema [No Rashes] : no rashes [No Ulceration] : no ulceration [de-identified] : L > R. Patient reports only slight asymmetry prior to pregnancy [de-identified] : Lump in UIQ and central breast which feels like engorged tissue.

## 2021-03-02 NOTE — CONSULT LETTER
[Dear  ___] : Dear  [unfilled], [Consult Letter:] : I had the pleasure of evaluating your patient, [unfilled]. [Please see my note below.] : Please see my note below. [Consult Closing:] : Thank you very much for allowing me to participate in the care of this patient.  If you have any questions, please do not hesitate to contact me. [Sincerely,] : Sincerely, [FreeTextEntry3] : Karie Adam MD  [DrKamilla  ___] : Dr. AU

## 2021-03-02 NOTE — PAST MEDICAL HISTORY
[Menarche Age ____] : age at menarche was [unfilled] [Total Preg ___] : G[unfilled] [Live Births ___] : P[unfilled]  [Premature ___] : Premature: [unfilled] [Age At Live Birth ___] : Age at live birth: [unfilled] [FreeTextEntry8] : 11 weeks so far

## 2021-03-05 ENCOUNTER — APPOINTMENT (OUTPATIENT)
Dept: BREAST CENTER | Facility: CLINIC | Age: 36
End: 2021-03-05
Payer: COMMERCIAL

## 2021-03-05 ENCOUNTER — LABORATORY RESULT (OUTPATIENT)
Age: 36
End: 2021-03-05

## 2021-03-05 VITALS
DIASTOLIC BLOOD PRESSURE: 65 MMHG | HEIGHT: 62 IN | SYSTOLIC BLOOD PRESSURE: 109 MMHG | HEART RATE: 64 BPM | WEIGHT: 125 LBS | BODY MASS INDEX: 23 KG/M2

## 2021-03-05 PROCEDURE — 10160 PNXR ASPIR ABSC HMTMA BULLA: CPT

## 2021-03-05 PROCEDURE — 99214 OFFICE O/P EST MOD 30 MIN: CPT | Mod: 25

## 2021-03-05 PROCEDURE — 76642 ULTRASOUND BREAST LIMITED: CPT | Mod: LT

## 2021-03-05 PROCEDURE — 99072 ADDL SUPL MATRL&STAF TM PHE: CPT

## 2021-03-05 NOTE — PROCEDURE
[FreeTextEntry1] : L breast US and FNA [FreeTextEntry2] : L breast abscess [FreeTextEntry3] : The patient was placed in a supine position, and the left medial breast was scanned in both transverse and sagittal orientations. At the 9:00 N4 position is a deep fluid collection that is longer than the size of the probe and extends to the retroareolar region. This correlates with the findings on recent ultrasound. My impression is that this is an abscess, BIRADS 2, and FNA is recommended. \par \par The procedure was reviewed, and verbal consent was obtained. The risk of milk fistula is reviewed, and to minimize that, the skin site will be more peripherally placed. The breast was prepped with betadine and sterilely draped. 10 ml of 1% lidocaine was injected for local anesthesia. A #11 scalpel is used to make a nick in the skin. Under ultrasound guidance, a 14G seroma catheter needle was advanced in the medial to lateral direction into the deep fluid collection. 3 ml of purulent fluid was aspirated. There was a residual fluid collection but nothing more can be aspirated due to the thick nature of the purulent fluid. Pressure was applied for hemostasis, and the skin site was dressed with Steri-strips and a Band-Aid. An ice pack is provided. \par \par The patient tolerated the procedure well.

## 2021-03-05 NOTE — PHYSICAL EXAM
[Normocephalic] : normocephalic [Atraumatic] : atraumatic [EOMI] : extra ocular movement intact [Sclera nonicteric] : sclera nonicteric [Examined in the supine and seated position] : examined in the supine and seated position [Asymmetrical] : asymmetrical [No dominant masses] : no dominant masses in right breast  [No Nipple Retraction] : no left nipple retraction [No Nipple Discharge] : no left nipple discharge [No Edema] : no edema [No Rashes] : no rashes [No Ulceration] : no ulceration [de-identified] : L > R. Patient reports only slight asymmetry prior to pregnancy [de-identified] : Lump in UIQ and central breast, a little softer. Lump in UOQ which feels like engorged tissue. No redness or warmth.

## 2021-03-05 NOTE — DATA REVIEWED
[FreeTextEntry1] : L complete US 2/5/21\par - L breast cysts up to 1.2 cm\par - BIRADS 2\par \par L limited US 3/2/21\par - 4.6 x 1 x 3.2 cm deep cystic fluid collection in L breast 11:00 N5\par - sub-cm cysts\par - BIRADS 3; following treatment, have f/up US if symptoms do not resolve

## 2021-03-05 NOTE — CONSULT LETTER
[Dear  ___] : Dear  [unfilled], [Courtesy Letter:] : I had the pleasure of seeing your patient, [unfilled], in my office today. [Please see my note below.] : Please see my note below. [Consult Closing:] : Thank you very much for allowing me to participate in the care of this patient.  If you have any questions, please do not hesitate to contact me. [Sincerely,] : Sincerely, [FreeTextEntry3] : Karie Adam MD

## 2021-03-05 NOTE — HISTORY OF PRESENT ILLNESS
[FreeTextEntry1] : Ms. Sun is a 26 year old woman who presents for a consultation for a left breast lump. She has been breastfeeding for 11 weeks. In early February, she had shooting pains in the left breast that went away after 2 nights. In late February, she noticed a lump in the left superior breast with an engorged feeling and tenderness, that had a slight decrease after breastfeeding. Warm compress, massage, sunflower, lecithin have been without effect. She alternates between breastfeeding and pumping as her baby has reflux and drinks from a bottle with cereal mixed into the milk. The day after the office visit, she developed a dime-sized red spot in the upper inner left breast, had an ultrasound showing a deep fluid collection, and was started on dicloxacillin on Tuesday. She had fever and chills on Tuesday night, but then the redness and pain improved. No further fevers or chills. The lump in the upper inner breast is slightly softer, although she now has new lumps in the upper outer breast. Her milk supply has decreased this week.\par \par Her family history is significant for breast cancer in her mother's cousin at 55.

## 2021-03-10 ENCOUNTER — APPOINTMENT (OUTPATIENT)
Dept: BREAST CENTER | Facility: CLINIC | Age: 36
End: 2021-03-10
Payer: COMMERCIAL

## 2021-03-10 VITALS
HEART RATE: 76 BPM | SYSTOLIC BLOOD PRESSURE: 120 MMHG | BODY MASS INDEX: 23 KG/M2 | WEIGHT: 125 LBS | HEIGHT: 62 IN | DIASTOLIC BLOOD PRESSURE: 65 MMHG

## 2021-03-10 PROCEDURE — 99072 ADDL SUPL MATRL&STAF TM PHE: CPT

## 2021-03-10 PROCEDURE — 76642 ULTRASOUND BREAST LIMITED: CPT

## 2021-03-10 PROCEDURE — 99213 OFFICE O/P EST LOW 20 MIN: CPT | Mod: 25

## 2021-03-10 NOTE — HISTORY OF PRESENT ILLNESS
[FreeTextEntry1] : Ms. Sun is a 26 year old woman who presents for a consultation for left mastitis with abscess. She has been breastfeeding for 11 weeks. In early February, she had shooting pains in the left breast that went away after 2 nights. In late February, she noticed an engorged and tender lump in the left superior breast. She alternates between breastfeeding and pumping as her baby occasionally drinks from a bottle with cereal mixed for his reflux. Last week Tuesday, she developed a dime-sized red spot in the upper inner left breast, had an ultrasound showing a deep fluid collection, was started on dicloxacillin, and had fever and chills that night. Since then, no further fevers or chills. The redness disappeared. The pain has steadily improved, and the lump seems softer. Last Friday, FNA was done of the deep fluid collection but only a small amount of pus could be aspirated owing to the thick nature of it. Since then, she continues to have small amounts of improvement. No milk drainage from the needle site. Her last dose of dicloxacillin was yesterday morning.\par \par Her family history is significant for breast cancer in her mother's cousin at 55.

## 2021-03-10 NOTE — PHYSICAL EXAM
[Normocephalic] : normocephalic [Atraumatic] : atraumatic [EOMI] : extra ocular movement intact [Sclera nonicteric] : sclera nonicteric [Examined in the supine and seated position] : examined in the supine and seated position [Asymmetrical] : asymmetrical [No dominant masses] : no dominant masses in right breast  [No Nipple Retraction] : no left nipple retraction [No Nipple Discharge] : no left nipple discharge [No Edema] : no edema [No Rashes] : no rashes [No Ulceration] : no ulceration [de-identified] : L > R. Patient reports only slight asymmetry prior to pregnancy [de-identified] : Lump in UIQ and central breast, a little softer. No redness or warmth. No milk fistula

## 2021-03-10 NOTE — PROCEDURE
[FreeTextEntry1] : Targeted L breast US [FreeTextEntry2] : L mastitis and abscess [FreeTextEntry3] : The patient was placed in a supine position, and the left medial and upper inner breast was scanned in both transverse and sagittal orientations. The prior fluid collection is decreased, and the area is more hypoechoic than anechoic. At the area of the lump is a heterogenous region with anechoic and hypoechoic areas.

## 2021-03-10 NOTE — DATA REVIEWED
[FreeTextEntry1] : L complete US 2/5/21\par - L breast cysts up to 1.2 cm\par - BIRADS 2\par \par L limited US 3/2/21\par - 4.6 x 1 x 3.2 cm deep cystic fluid collection in L breast 11:00 N5\par - sub-cm cysts\par - BIRADS 3; following treatment, have f/up US if symptoms do not resolve\par \par Fluid culture 3/5/21\par - L breast = moderate Staphylococcus aureus; S to ampicillin, oxacillin, ancef, clindamycin, bactrim; R to penicillin

## 2021-03-15 ENCOUNTER — APPOINTMENT (OUTPATIENT)
Dept: BREAST CENTER | Facility: CLINIC | Age: 36
End: 2021-03-15

## 2021-03-17 ENCOUNTER — APPOINTMENT (OUTPATIENT)
Dept: BREAST CENTER | Facility: CLINIC | Age: 36
End: 2021-03-17
Payer: COMMERCIAL

## 2021-03-17 VITALS
SYSTOLIC BLOOD PRESSURE: 104 MMHG | HEIGHT: 62 IN | DIASTOLIC BLOOD PRESSURE: 64 MMHG | BODY MASS INDEX: 23 KG/M2 | WEIGHT: 125 LBS | HEART RATE: 81 BPM

## 2021-03-17 PROCEDURE — 99072 ADDL SUPL MATRL&STAF TM PHE: CPT

## 2021-03-17 PROCEDURE — 99213 OFFICE O/P EST LOW 20 MIN: CPT

## 2021-03-17 NOTE — PHYSICAL EXAM
[Normocephalic] : normocephalic [Atraumatic] : atraumatic [EOMI] : extra ocular movement intact [Sclera nonicteric] : sclera nonicteric [Examined in the supine and seated position] : examined in the supine and seated position [Asymmetrical] : asymmetrical [No dominant masses] : no dominant masses in right breast  [No Nipple Retraction] : no left nipple retraction [No Nipple Discharge] : no left nipple discharge [No Edema] : no edema [No Rashes] : no rashes [No Ulceration] : no ulceration [de-identified] : L > R. Patient reports only slight asymmetry prior to pregnancy [de-identified] : Lump in UIQ and central breast is smaller. No redness, edema, or tenderness

## 2021-03-17 NOTE — HISTORY OF PRESENT ILLNESS
[FreeTextEntry1] : Ms. Sun is a 36 year old woman who presents for a follow-up for left mastitis with abscess. She is breastfeeding. In early February, she had shooting pains in the left breast that went away after 2 nights. In late February, she noticed an engorged and tender lump in the left superior breast. She alternates between breastfeeding and pumping as her baby occasionally drinks from a bottle with cereal mixed for his reflux. on 3/2/21, she developed a dime-sized red spot in the upper inner left breast, had an ultrasound showing a deep fluid collection, was started on dicloxacillin, and had fever and chills that night. Since then, no further fevers or chills. The redness disappeared. The pain has improved, and the lump seems softer. An FNA was done of the deep fluid collection on 3/5/21 but only a small amount of pus could be aspirated owing to the thick nature of it. She has been treated with dicloxacillin followed by Ancef for Staphylococcus that grew from the FNA aspirate. Since the last office visit, she feels much better. No pain, redness. The lump is smaller. Her milk production had declined but has now returned to normal.\par \par Her family history is significant for breast cancer in her mother's cousin at 55.

## 2021-04-07 ENCOUNTER — TRANSCRIPTION ENCOUNTER (OUTPATIENT)
Age: 36
End: 2021-04-07

## 2021-04-07 ENCOUNTER — APPOINTMENT (OUTPATIENT)
Dept: BREAST CENTER | Facility: CLINIC | Age: 36
End: 2021-04-07
Payer: COMMERCIAL

## 2021-04-07 VITALS
SYSTOLIC BLOOD PRESSURE: 105 MMHG | DIASTOLIC BLOOD PRESSURE: 63 MMHG | HEART RATE: 72 BPM | HEIGHT: 62 IN | BODY MASS INDEX: 23 KG/M2 | WEIGHT: 125 LBS

## 2021-04-07 PROCEDURE — 99213 OFFICE O/P EST LOW 20 MIN: CPT

## 2021-04-07 PROCEDURE — 99072 ADDL SUPL MATRL&STAF TM PHE: CPT

## 2021-04-07 RX ORDER — CEPHALEXIN 250 MG/1
250 TABLET ORAL EVERY 6 HOURS
Qty: 40 | Refills: 0 | Status: DISCONTINUED | COMMUNITY
Start: 2021-03-10 | End: 2021-04-07

## 2021-04-07 RX ORDER — DICLOXACILLIN SODIUM 500 MG/1
500 CAPSULE ORAL 4 TIMES DAILY
Qty: 28 | Refills: 0 | Status: DISCONTINUED | COMMUNITY
Start: 2021-03-02 | End: 2021-04-07

## 2021-04-07 NOTE — HISTORY OF PRESENT ILLNESS
[FreeTextEntry1] : Ms. Sun is a 36 year old woman who presents for a follow-up for left mastitis with abscess. She is breastfeeding. In early February, she had shooting pains in the left breast that went away after 2 nights. In late February, she noticed an engorged and tender lump in the left superior breast. She alternates between breastfeeding and pumping as her baby occasionally drinks from a bottle with cereal mixed for his reflux. on 3/2/21, she developed a dime-sized red spot in the upper inner left breast, had an ultrasound showing a deep fluid collection, was started on dicloxacillin, and had fever and chills that night. Since then, no further fevers or chills. The redness disappeared. The pain has improved, and the lump seems softer. An FNA was done of the deep fluid collection on 3/5/21 but only a small amount of pus could be aspirated owing to the thick nature of it. She has been treated with dicloxacillin followed by Ancef for Staphylococcus that grew from the FNA aspirate. \par \par Since the last office visit, she no longer feels any lump in the left breast. No pain or redness. She has been pumping at night solely to try keep the breast empty of milk.\par \par Her family history is significant for breast cancer in her mother's cousin at 55.

## 2021-04-07 NOTE — PHYSICAL EXAM
[Normocephalic] : normocephalic [Atraumatic] : atraumatic [EOMI] : extra ocular movement intact [Sclera nonicteric] : sclera nonicteric [Examined in the supine and seated position] : examined in the supine and seated position [Asymmetrical] : asymmetrical [No dominant masses] : no dominant masses in right breast  [No dominant masses] : no dominant masses left breast [No Nipple Retraction] : no left nipple retraction [No Nipple Discharge] : no left nipple discharge [No Axillary Lymphadenopathy] : no left axillary lymphadenopathy [No Edema] : no edema [No Rashes] : no rashes [No Ulceration] : no ulceration [de-identified] : L > R. Patient reports only slight asymmetry prior to pregnancy [de-identified] : No discrete mass. Engorged tissue in superior and upper outer breast.

## 2021-04-08 ENCOUNTER — TRANSCRIPTION ENCOUNTER (OUTPATIENT)
Age: 36
End: 2021-04-08

## 2021-04-19 ENCOUNTER — APPOINTMENT (OUTPATIENT)
Dept: OBGYN | Facility: CLINIC | Age: 36
End: 2021-04-19
Payer: COMMERCIAL

## 2021-04-19 VITALS
DIASTOLIC BLOOD PRESSURE: 64 MMHG | HEIGHT: 62 IN | BODY MASS INDEX: 22.45 KG/M2 | WEIGHT: 122 LBS | SYSTOLIC BLOOD PRESSURE: 102 MMHG

## 2021-04-19 PROCEDURE — 99213 OFFICE O/P EST LOW 20 MIN: CPT

## 2021-04-19 PROCEDURE — 99072 ADDL SUPL MATRL&STAF TM PHE: CPT

## 2021-04-19 NOTE — DISCUSSION/SUMMARY
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

## 2021-04-19 NOTE — HISTORY OF PRESENT ILLNESS
[FreeTextEntry1] : No menses. back to work next week.  .  mastitis  and March,.\par \par Breastfeeding full time. feeling well. \par Pelvic floor PT.\par \par  [PapSmeardate] : 6/2020 [TextBox_31] : Neg.  HPV neg but pos in past. Colpo neg in 2016

## 2021-05-07 DIAGNOSIS — Z87.19 PERSONAL HISTORY OF OTHER DISEASES OF THE DIGESTIVE SYSTEM: ICD-10-CM

## 2021-05-07 DIAGNOSIS — D72.819 DECREASED WHITE BLOOD CELL COUNT, UNSPECIFIED: ICD-10-CM

## 2021-06-07 ENCOUNTER — NON-APPOINTMENT (OUTPATIENT)
Age: 36
End: 2021-06-07

## 2021-07-13 ENCOUNTER — APPOINTMENT (OUTPATIENT)
Dept: OBGYN | Facility: CLINIC | Age: 36
End: 2021-07-13
Payer: COMMERCIAL

## 2021-07-13 VITALS
WEIGHT: 118 LBS | HEIGHT: 62 IN | DIASTOLIC BLOOD PRESSURE: 70 MMHG | SYSTOLIC BLOOD PRESSURE: 102 MMHG | BODY MASS INDEX: 21.71 KG/M2

## 2021-07-13 PROCEDURE — 99072 ADDL SUPL MATRL&STAF TM PHE: CPT

## 2021-07-13 PROCEDURE — 99395 PREV VISIT EST AGE 18-39: CPT

## 2021-07-13 NOTE — PHYSICAL EXAM
[Appropriately responsive] : appropriately responsive [Alert] : alert [No Acute Distress] : no acute distress [No Lymphadenopathy] : no lymphadenopathy [Soft] : soft [Non-tender] : non-tender [Non-distended] : non-distended [No HSM] : No HSM [No Lesions] : no lesions [No Mass] : no mass [Oriented x3] : oriented x3 [FreeTextEntry6] : left nipple intact, but somewhat fuschia in color. No masses b/l [Examination Of The Breasts] : a normal appearance [No Masses] : no breast masses were palpable [Labia Majora] : normal [Labia Minora] : normal [Normal] : normal [Uterine Adnexae] : normal [FreeTextEntry4] : Hymen well healed. No sutures palpated. tender at repair site on left at Hymen.  [FreeTextEntry9] : No external hemorrhoids, rectal deferred. No fissures nor redness.

## 2021-07-15 ENCOUNTER — NON-APPOINTMENT (OUTPATIENT)
Age: 36
End: 2021-07-15

## 2021-07-18 LAB
CANDIDA VAG CYTO: NOT DETECTED
G VAGINALIS+PREV SP MTYP VAG QL MICRO: NOT DETECTED
T VAGINALIS VAG QL WET PREP: NOT DETECTED

## 2021-08-02 NOTE — CHIEF COMPLAINT
[Follow Up] : follow up GYN visit [FreeTextEntry1] : 6 month check on ocp. Had nml hpv+ pap 5/18. Getting marriied in July. Then will go off ocp. Secondary Intention Text (Leave Blank If You Do Not Want): The defect will heal with secondary intention.

## 2021-12-07 ENCOUNTER — NON-APPOINTMENT (OUTPATIENT)
Age: 36
End: 2021-12-07

## 2021-12-07 ENCOUNTER — APPOINTMENT (OUTPATIENT)
Dept: INTERNAL MEDICINE | Facility: CLINIC | Age: 36
End: 2021-12-07
Payer: COMMERCIAL

## 2021-12-07 VITALS — SYSTOLIC BLOOD PRESSURE: 112 MMHG | DIASTOLIC BLOOD PRESSURE: 80 MMHG

## 2021-12-07 PROCEDURE — 99213 OFFICE O/P EST LOW 20 MIN: CPT

## 2021-12-07 NOTE — HISTORY OF PRESENT ILLNESS
[FreeTextEntry8] : She presents complaining of persistent cough that started 1 week ago.  She had some fever initially but none since.  She was tested for Covid twice and both were negative.  She is feeling better but still has a cough that is occasionally loose and productive of occasionally blood tinged sputum but otherwise clear.  She has no chest pain or shortness of breath or wheezing.

## 2021-12-07 NOTE — REVIEW OF SYSTEMS
[Fever] : no fever [Chills] : no chills [Chest Pain] : no chest pain [Palpitations] : no palpitations [Shortness Of Breath] : no shortness of breath [Cough] : cough [Dyspnea on Exertion] : no dyspnea on exertion

## 2022-03-29 ENCOUNTER — APPOINTMENT (OUTPATIENT)
Dept: INTERNAL MEDICINE | Facility: CLINIC | Age: 37
End: 2022-03-29
Payer: COMMERCIAL

## 2022-03-29 VITALS — SYSTOLIC BLOOD PRESSURE: 110 MMHG | DIASTOLIC BLOOD PRESSURE: 70 MMHG

## 2022-03-29 PROCEDURE — 99213 OFFICE O/P EST LOW 20 MIN: CPT

## 2022-03-29 NOTE — HISTORY OF PRESENT ILLNESS
[de-identified] : 38 y/o female presents complaining of some persistent pain in her right index finger DIP joint area.  She had a minor laceration to the area about 6-8 weeks ago.  She treated it locally and it had improved but then had some swelling afterwards and was seen in urgent care and had a sonogram done that was unremarkable for possible abscess.  She has no swelling or significant pain in the area.

## 2022-03-29 NOTE — PHYSICAL EXAM
[No Acute Distress] : no acute distress [de-identified] : There is a well healed small laceration on the dorsal aspect of the right index finger over the DIP joint area.  The area is nontender and there is no swelling.  Sensation is normal and range of motion is normal there is no vascular compromise.

## 2022-03-29 NOTE — ASSESSMENT
[FreeTextEntry1] : Finger pain/previous laceration–the wound appears completely healed.  The rest of the exam is unremarkable and there is no evidence of infection or possible fluid collection or abscess.  An MRI had been recommended by the urgent care center but she was told that is not necessary at this point.  She was told the area remained tender for an extended period of time because of the location of the previous laceration and the fact that it lies over the joint area.  She will call or follow-up if is any change.

## 2022-04-20 NOTE — ASSESSMENT
[FreeTextEntry1] : Resolving bronchitis–symptomatically she is much improved and she was told her exam was unremarkable with clear lungs.  She was told this is most likely a viral process and that it should resolve spontaneously.  If it does persist or worsens we can reconsider starting antibiotic.  She is anxious to be on antibiotics because she recently is completing treatment for persistent C. difficile diarrhea that was precipitated by multiple rounds of antibiotics because of mastitis.
Forceps were not used

## 2022-08-01 ENCOUNTER — APPOINTMENT (OUTPATIENT)
Dept: OBGYN | Facility: CLINIC | Age: 37
End: 2022-08-01

## 2022-08-01 VITALS
SYSTOLIC BLOOD PRESSURE: 100 MMHG | HEIGHT: 62 IN | WEIGHT: 120 LBS | BODY MASS INDEX: 22.08 KG/M2 | DIASTOLIC BLOOD PRESSURE: 70 MMHG

## 2022-08-01 PROCEDURE — 99395 PREV VISIT EST AGE 18-39: CPT

## 2022-08-01 RX ORDER — NORETHINDRONE 0.35 MG/1
0.35 TABLET ORAL
Qty: 84 | Refills: 1 | Status: COMPLETED | COMMUNITY
Start: 2021-04-19 | End: 2022-08-01

## 2022-08-01 NOTE — HISTORY OF PRESENT ILLNESS
[FreeTextEntry1] : 38 yo  with LMP  23 for annual\par \par Menses returned to nromal\par Nursed / pumped for 8 months\par recall Mastitis, abscess, antibiotics(Amoxicillin, dicloxaxillin and cephalexin) and CDiff - she had vanco x 2 each 10days, tapering from giles Gonzales. Then Dr Troy in [facility].\par \par OB:\par 20  34.2 wks M .  First PTL 33 wks\par \par Desired conception 1 yr

## 2022-08-01 NOTE — DISCUSSION/SUMMARY
[FreeTextEntry1] : pap today, h/o cryotherapy\par 6/10/2020 Pap and HPV neg. Prior HPV + (not 16/18/45)\par \par Condoms for contraception\par Pre-conceptual counselling:\par see Jennifer Lebron or Dr Tyrell Paniagua\par Taking probiotic as pt needed long course of tx for C Diff following mastitis antibiotics\par also needed \par nursed for 8 months. No abscess and breast exam normal. \par \par Proctitis:\par colonoscopy 2019 only\par used mesalamine TIW in last pregnancy

## 2022-08-08 LAB — HPV HIGH+LOW RISK DNA PNL CVX: NOT DETECTED

## 2022-08-09 LAB — CYTOLOGY CVX/VAG DOC THIN PREP: ABNORMAL

## 2022-09-27 ENCOUNTER — APPOINTMENT (OUTPATIENT)
Dept: FAMILY MEDICINE | Facility: CLINIC | Age: 37
End: 2022-09-27

## 2022-09-27 ENCOUNTER — LABORATORY RESULT (OUTPATIENT)
Age: 37
End: 2022-09-27

## 2022-09-27 VITALS
OXYGEN SATURATION: 98 % | HEART RATE: 61 BPM | BODY MASS INDEX: 21.99 KG/M2 | HEIGHT: 62 IN | TEMPERATURE: 98 F | WEIGHT: 119.5 LBS | DIASTOLIC BLOOD PRESSURE: 80 MMHG | SYSTOLIC BLOOD PRESSURE: 102 MMHG

## 2022-09-27 DIAGNOSIS — Z00.00 ENCOUNTER FOR GENERAL ADULT MEDICAL EXAMINATION W/OUT ABNORMAL FINDINGS: ICD-10-CM

## 2022-09-27 PROCEDURE — 99385 PREV VISIT NEW AGE 18-39: CPT

## 2022-09-27 NOTE — ASSESSMENT
[FreeTextEntry1] : Pt presenting for annual physical. \par \par Reviewed diet, exercise (150 min/moderate intensity exercise weekly), lifestyle modifications. \par Discussed STD prevention and discussed screening studies per below:\par \par Breast Cancer- Mammogram at 40\par Cervical Cancer- Pap\par protactitis- GI referral \par \par Labs ordered per above. \par f.u in 1 year or earlier if needed \par \par \par

## 2022-09-27 NOTE — HEALTH RISK ASSESSMENT
[Good] : ~his/her~  mood as  good [Never] : Never [Yes] : Yes [2 - 4 times a month (2 pts)] : 2-4 times a month (2 points) [No] : In the past 12 months have you used drugs other than those required for medical reasons? No [No falls in past year] : Patient reported no falls in the past year [0] : 2) Feeling down, depressed, or hopeless: Not at all (0) [PHQ-2 Negative - No further assessment needed] : PHQ-2 Negative - No further assessment needed [Patient reported PAP Smear was normal] : Patient reported PAP Smear was normal [ColonoscopyComments] : scheduled

## 2022-09-27 NOTE — HISTORY OF PRESENT ILLNESS
[de-identified] : PT presenting for CPE\par \par Hx of prostatitis- takes probiotic, + mesalamine prn\par colonoscopy scheduled next month \par since July 2019\par \par gum tissue graft- 7 years ago \par \par Fam hx of breast cancer\par

## 2022-09-29 LAB
25(OH)D3 SERPL-MCNC: 38.1 NG/ML
ALBUMIN SERPL ELPH-MCNC: 4.8 G/DL
ALP BLD-CCNC: 47 U/L
ALT SERPL-CCNC: 12 U/L
ANION GAP SERPL CALC-SCNC: 9 MMOL/L
APPEARANCE: ABNORMAL
AST SERPL-CCNC: 11 U/L
BASOPHILS # BLD AUTO: 0.02 K/UL
BASOPHILS NFR BLD AUTO: 0.5 %
BILIRUB SERPL-MCNC: 0.5 MG/DL
BILIRUBIN URINE: NEGATIVE
BLOOD URINE: NEGATIVE
BUN SERPL-MCNC: 14 MG/DL
CALCIUM SERPL-MCNC: 9.4 MG/DL
CHLORIDE SERPL-SCNC: 105 MMOL/L
CHOLEST SERPL-MCNC: 161 MG/DL
CO2 SERPL-SCNC: 27 MMOL/L
COLOR: YELLOW
CREAT SERPL-MCNC: 0.59 MG/DL
EGFR: 120 ML/MIN/1.73M2
EOSINOPHIL # BLD AUTO: 0.05 K/UL
EOSINOPHIL NFR BLD AUTO: 1.1 %
ESTIMATED AVERAGE GLUCOSE: 100 MG/DL
GLUCOSE QUALITATIVE U: NEGATIVE
GLUCOSE SERPL-MCNC: 84 MG/DL
HBA1C MFR BLD HPLC: 5.1 %
HCT VFR BLD CALC: 39.3 %
HDLC SERPL-MCNC: 56 MG/DL
HGB BLD-MCNC: 13.5 G/DL
IMM GRANULOCYTES NFR BLD AUTO: 0.2 %
KETONES URINE: NEGATIVE
LDLC SERPL CALC-MCNC: 94 MG/DL
LEUKOCYTE ESTERASE URINE: NEGATIVE
LYMPHOCYTES # BLD AUTO: 1.18 K/UL
LYMPHOCYTES NFR BLD AUTO: 27 %
MAN DIFF?: NORMAL
MCHC RBC-ENTMCNC: 30.8 PG
MCHC RBC-ENTMCNC: 34.4 GM/DL
MCV RBC AUTO: 89.7 FL
MONOCYTES # BLD AUTO: 0.35 K/UL
MONOCYTES NFR BLD AUTO: 8 %
NEUTROPHILS # BLD AUTO: 2.76 K/UL
NEUTROPHILS NFR BLD AUTO: 63.2 %
NITRITE URINE: NEGATIVE
NONHDLC SERPL-MCNC: 105 MG/DL
PH URINE: 6
PLATELET # BLD AUTO: 321 K/UL
POTASSIUM SERPL-SCNC: 4.8 MMOL/L
PROT SERPL-MCNC: 6.6 G/DL
PROTEIN URINE: NORMAL
RBC # BLD: 4.38 M/UL
RBC # FLD: 11.8 %
SODIUM SERPL-SCNC: 142 MMOL/L
SPECIFIC GRAVITY URINE: 1.02
TRIGL SERPL-MCNC: 55 MG/DL
TSH SERPL-ACNC: 1.61 UIU/ML
UROBILINOGEN URINE: NORMAL
WBC # FLD AUTO: 4.37 K/UL

## 2023-03-06 ENCOUNTER — APPOINTMENT (OUTPATIENT)
Dept: OBGYN | Facility: CLINIC | Age: 38
End: 2023-03-06
Payer: COMMERCIAL

## 2023-03-06 VITALS
SYSTOLIC BLOOD PRESSURE: 110 MMHG | WEIGHT: 119 LBS | HEIGHT: 62 IN | DIASTOLIC BLOOD PRESSURE: 70 MMHG | BODY MASS INDEX: 21.9 KG/M2

## 2023-03-06 DIAGNOSIS — Z87.19 PERSONAL HISTORY OF OTHER DISEASES OF THE DIGESTIVE SYSTEM: ICD-10-CM

## 2023-03-06 LAB
HCG UR QL: POSITIVE
QUALITY CONTROL: YES

## 2023-03-06 PROCEDURE — 81025 URINE PREGNANCY TEST: CPT

## 2023-03-06 PROCEDURE — 99214 OFFICE O/P EST MOD 30 MIN: CPT | Mod: 25

## 2023-03-06 PROCEDURE — 36415 COLL VENOUS BLD VENIPUNCTURE: CPT

## 2023-03-06 NOTE — PROCEDURE
[Intrauterine Pregnancy] : intrauterine pregnancy [Yolk Sac] : yolk sac present [Fetal Heart] : no fetal heart [WNL] : Transvaginal OB Sonogram WNL [FreeTextEntry1] : Transvaginal OB ultrasound performed: An intrauterine gestational sac is seen, a small yolk sac and the beginnings of a fetal pole are noted no obvious fetal heart motion noted at this time.

## 2023-03-06 NOTE — COUNSELING
[Vitamins/Supplements] : vitamins/supplements [Lab Results] : lab results [Medication Management] : medication management

## 2023-03-06 NOTE — PHYSICAL EXAM
[Normal] : uterus [No Bleeding] : there was no active vaginal bleeding [Enlarged ___ wks] : not enlarged [Uterine Adnexae] : were not tender and not enlarged

## 2023-03-06 NOTE — CHIEF COMPLAINT
[Urgent Visit] : Urgent Visit [FreeTextEntry1] : Patient is a 38-year-old female who presents for an urgent same-day visit with complaints of a menorrhea and positive home pregnancy test.  Patient also recently diagnosed with proctitis and has been placed on mesalamine.  Patient's last menstrual period was January 25 which puts the patient at 5 weeks 4 days gestation.

## 2023-03-07 LAB
ABO + RH PNL BLD: NORMAL
HCG SERPL-MCNC: 5011 MIU/ML
PROGEST SERPL-MCNC: 38.4 NG/ML

## 2023-03-10 ENCOUNTER — LABORATORY RESULT (OUTPATIENT)
Age: 38
End: 2023-03-10

## 2023-03-13 ENCOUNTER — TRANSCRIPTION ENCOUNTER (OUTPATIENT)
Age: 38
End: 2023-03-13

## 2023-03-15 ENCOUNTER — NON-APPOINTMENT (OUTPATIENT)
Age: 38
End: 2023-03-15

## 2023-03-24 ENCOUNTER — APPOINTMENT (OUTPATIENT)
Dept: OBGYN | Facility: CLINIC | Age: 38
End: 2023-03-24
Payer: COMMERCIAL

## 2023-03-24 VITALS
HEIGHT: 62 IN | DIASTOLIC BLOOD PRESSURE: 64 MMHG | BODY MASS INDEX: 22.63 KG/M2 | SYSTOLIC BLOOD PRESSURE: 116 MMHG | WEIGHT: 123 LBS

## 2023-03-24 PROCEDURE — 81025 URINE PREGNANCY TEST: CPT

## 2023-03-24 PROCEDURE — 99213 OFFICE O/P EST LOW 20 MIN: CPT | Mod: 25

## 2023-03-24 PROCEDURE — 76830 TRANSVAGINAL US NON-OB: CPT

## 2023-03-24 RX ORDER — MESALAMINE 400 MG/1
CAPSULE, DELAYED RELEASE ORAL
Refills: 0 | Status: ACTIVE | COMMUNITY

## 2023-03-24 RX ORDER — MESALAMINE 1.2 G/1
1.2 TABLET, DELAYED RELEASE ORAL
Refills: 0 | Status: ACTIVE | COMMUNITY

## 2023-03-24 NOTE — HISTORY OF PRESENT ILLNESS
[FreeTextEntry1] : 37 yo  with LMP 24 with positive pregnancy test and flare of proctitis\par \par CC: [facility]\par \par CC2: cramping, bloating, mucus and bloody stools\par \par OB:21  M 5.9# at 34.2wks (Aric) PTL at 33 wks\par \par GYN:\par 20s HPV on cervix - cryotherapy\par \par Med:\par Proctitis- dx age 34 - Dr Jimmy Quinones \par Mastitis -starting at 3 months -  Amoxicillin, dicloxacillin, and Cephalexin due to pumping secondary to PTL\par C Diff - following cephalosporins with mastitis, Dr Woodrow HURST helped rid pt of C Diff\par  via prolonged Vancomycin after third course. [PapSmeardate] : 8/1/22 [TextBox_31] : neg [ColonoscopyDate] : 10/31/22 [TextBox_43] : proctitis only, Dr Jimmy Quinones

## 2023-03-24 NOTE — REVIEW OF SYSTEMS
[Negative] : Musculoskeletal [FreeTextEntry7] : bloody, mucus stools / proctitis prior to current mesalamine

## 2023-03-24 NOTE — DISCUSSION/SUMMARY
[FreeTextEntry1] : Amenorrhea:\par LMP 1/25/23\par SOTERO 10/30/23 (10/19/23 noted on US today)\par Taking PNV\par \par \par Colitis:\par -mesalamine 1.2 gm BID since recent blood and mucus BMs\par -Dr Quinones performed colonoscopy 10/22 noting only proctitis\par -pt asking if medication is safe in first trimester and if it should be continued.\par -Given successful resolution of flare, will cont until MFM consult.\par -names of consultants provided.\par \par MFM consult needed for h/o PTL at 34 wks and IBD\par Ultrascreen 1 needed at 12 wks\par NIPS\par RTO 3 wks for NPN

## 2023-03-24 NOTE — PROCEDURE
[Amenorrhea] : Amenorrhea [Transvaginal Ultrasound] : transvaginal ultrasound [No Fibroid(s)] : no fibroid(s) [L: ___ cm] : L: [unfilled] cm [W: ___cm] : W: [unfilled] cm [H: ___ cm] : H: [unfilled] cm [FreeTextEntry3] : SIUP, 7.5wks, Fetal heart noted, normal yolk sac 5-6mm [FreeTextEntry5] : Cervix 3 cm long [FreeTextEntry7] : 4 x 3 x 2 cm with 2 cm simple cyst [FreeTextEntry8] : not seen [FreeTextEntry6] : no free fluid [FreeTextEntry4] : Early IUP c/w/d, Corpus luteum in right ovary

## 2023-03-25 LAB
C TRACH RRNA SPEC QL NAA+PROBE: NOT DETECTED
HCG UR QL: POSITIVE
N GONORRHOEA RRNA SPEC QL NAA+PROBE: NOT DETECTED
QUALITY CONTROL: YES
SOURCE AMPLIFICATION: NORMAL

## 2023-03-31 ENCOUNTER — NON-APPOINTMENT (OUTPATIENT)
Age: 38
End: 2023-03-31

## 2023-04-06 ENCOUNTER — NON-APPOINTMENT (OUTPATIENT)
Age: 38
End: 2023-04-06

## 2023-04-06 ENCOUNTER — APPOINTMENT (OUTPATIENT)
Dept: INTERNAL MEDICINE | Facility: CLINIC | Age: 38
End: 2023-04-06
Payer: COMMERCIAL

## 2023-04-06 VITALS — DIASTOLIC BLOOD PRESSURE: 68 MMHG | SYSTOLIC BLOOD PRESSURE: 110 MMHG

## 2023-04-06 PROCEDURE — 99213 OFFICE O/P EST LOW 20 MIN: CPT

## 2023-04-06 NOTE — REVIEW OF SYSTEMS
[Fever] : fever [Fatigue] : fatigue [Earache] : earache [Sore Throat] : sore throat [Postnasal Drip] : postnasal drip [Cough] : cough [Chest Pain] : no chest pain [Shortness Of Breath] : no shortness of breath

## 2023-04-06 NOTE — HISTORY OF PRESENT ILLNESS
[FreeTextEntry8] : Presents with persistent sinus and head congestion along with postnasal drip and loose cough over the past week.  She had a fever initially but none recently.  She was seen at walk-in clinic 5-6 days ago and had a negative flu, COVID and strep test.  She denies any chest pain or shortness of breath.  There is some mucus and sputum production that is somewhat discolored now.\par States that her son has a similar infection and that the pediatrician recently put him on amoxicillin and he is now improving.

## 2023-04-06 NOTE — ASSESSMENT
[FreeTextEntry1] : Sinusitis–symptoms have been persistent over the past week and somewhat progressive.  She has no fever or chills.  She was told the chest is clear.  She is concerned because she is in her first trimester of pregnancy.  It was explained that whether it be viral or bacterial a lot of these infections will resolve spontaneously.\par She is prescribed amoxicillin 5 mg 3 times daily for a week.  She was told if she wishes to hold off and wait a day or so to see how she is feeling that would be okay.

## 2023-04-12 ENCOUNTER — APPOINTMENT (OUTPATIENT)
Dept: OBGYN | Facility: CLINIC | Age: 38
End: 2023-04-12
Payer: COMMERCIAL

## 2023-04-12 VITALS — BODY MASS INDEX: 22.5 KG/M2 | DIASTOLIC BLOOD PRESSURE: 67 MMHG | WEIGHT: 123 LBS | SYSTOLIC BLOOD PRESSURE: 108 MMHG

## 2023-04-12 PROCEDURE — 36415 COLL VENOUS BLD VENIPUNCTURE: CPT

## 2023-04-12 PROCEDURE — 76817 TRANSVAGINAL US OBSTETRIC: CPT

## 2023-04-12 PROCEDURE — 0501F PRENATAL FLOW SHEET: CPT

## 2023-04-17 LAB
ABO + RH PNL BLD: NORMAL
APPEARANCE: ABNORMAL
B19V IGG SER QL IA: 4.51 INDEX
B19V IGG+IGM SER-IMP: NORMAL
B19V IGG+IGM SER-IMP: POSITIVE
B19V IGM FLD-ACNC: 0.19 INDEX
B19V IGM SER-ACNC: NEGATIVE
BACTERIA UR CULT: NORMAL
BACTERIA: NEGATIVE
BASOPHILS # BLD AUTO: 0.02 K/UL
BASOPHILS NFR BLD AUTO: 0.4 %
BILIRUBIN URINE: NEGATIVE
BLD GP AB SCN SERPL QL: NORMAL
BLOOD URINE: NEGATIVE
COLOR: YELLOW
EOSINOPHIL # BLD AUTO: 0.07 K/UL
EOSINOPHIL NFR BLD AUTO: 1.4 %
ESTIMATED AVERAGE GLUCOSE: 103 MG/DL
GLUCOSE QUALITATIVE U: NEGATIVE
HBA1C MFR BLD HPLC: 5.2 %
HBV SURFACE AG SER QL: NONREACTIVE
HCT VFR BLD CALC: 36.8 %
HCV AB SER QL: NONREACTIVE
HCV S/CO RATIO: 0.11 S/CO
HGB BLD-MCNC: 12.1 G/DL
HIV1+2 AB SPEC QL IA.RAPID: NONREACTIVE
HYALINE CASTS: 1 /LPF
IMM GRANULOCYTES NFR BLD AUTO: 0.2 %
KETONES URINE: NEGATIVE
LEUKOCYTE ESTERASE URINE: ABNORMAL
LYMPHOCYTES # BLD AUTO: 1.22 K/UL
LYMPHOCYTES NFR BLD AUTO: 24.7 %
MAN DIFF?: NORMAL
MCHC RBC-ENTMCNC: 30.3 PG
MCHC RBC-ENTMCNC: 32.9 GM/DL
MCV RBC AUTO: 92.2 FL
MICROSCOPIC-UA: NORMAL
MONOCYTES # BLD AUTO: 0.42 K/UL
MONOCYTES NFR BLD AUTO: 8.5 %
NEUTROPHILS # BLD AUTO: 3.19 K/UL
NEUTROPHILS NFR BLD AUTO: 64.8 %
NITRITE URINE: NEGATIVE
PH URINE: 6
PLATELET # BLD AUTO: 352 K/UL
PROTEIN URINE: NEGATIVE
RBC # BLD: 3.99 M/UL
RBC # FLD: 12 %
RED BLOOD CELLS URINE: 2 /HPF
RUBV IGG FLD-ACNC: 1.8 INDEX
RUBV IGG SER-IMP: POSITIVE
SPECIFIC GRAVITY URINE: 1.01
SQUAMOUS EPITHELIAL CELLS: 20 /HPF
T GONDII AB SER-IMP: NEGATIVE
T GONDII AB SER-IMP: NEGATIVE
T GONDII IGG SER QL: <3 IU/ML
T GONDII IGM SER QL: <3 AU/ML
T PALLIDUM AB SER QL IA: NEGATIVE
TSH SERPL-ACNC: 1.1 UIU/ML
UROBILINOGEN URINE: NORMAL
WBC # FLD AUTO: 4.93 K/UL
WHITE BLOOD CELLS URINE: 56 /HPF

## 2023-04-18 ENCOUNTER — APPOINTMENT (OUTPATIENT)
Dept: ANTEPARTUM | Facility: CLINIC | Age: 38
End: 2023-04-18
Payer: COMMERCIAL

## 2023-04-18 ENCOUNTER — ASOB RESULT (OUTPATIENT)
Age: 38
End: 2023-04-18

## 2023-04-18 PROCEDURE — 76813 OB US NUCHAL MEAS 1 GEST: CPT

## 2023-04-19 ENCOUNTER — NON-APPOINTMENT (OUTPATIENT)
Age: 38
End: 2023-04-19

## 2023-04-24 DIAGNOSIS — N64.4 MASTODYNIA: ICD-10-CM

## 2023-04-24 DIAGNOSIS — Z87.09 PERSONAL HISTORY OF OTHER DISEASES OF THE RESPIRATORY SYSTEM: ICD-10-CM

## 2023-04-24 DIAGNOSIS — Z34.91 ENCOUNTER FOR SUPERVISION OF NORMAL PREGNANCY, UNSPECIFIED, FIRST TRIMESTER: ICD-10-CM

## 2023-04-24 DIAGNOSIS — Z87.898 PERSONAL HISTORY OF OTHER SPECIFIED CONDITIONS: ICD-10-CM

## 2023-04-24 DIAGNOSIS — Z87.51 PERSONAL HISTORY OF PRE-TERM LABOR: ICD-10-CM

## 2023-04-24 DIAGNOSIS — O92.70 UNSPECIFIED DISORDERS OF LACTATION: ICD-10-CM

## 2023-04-24 DIAGNOSIS — Z87.42 PERSONAL HISTORY OF OTHER DISEASES OF THE FEMALE GENITAL TRACT: ICD-10-CM

## 2023-04-24 DIAGNOSIS — M79.646 PAIN IN UNSPECIFIED FINGER(S): ICD-10-CM

## 2023-04-24 DIAGNOSIS — Z13.0 ENCOUNTER FOR SCREENING FOR OTHER SUSPECTED ENDOCRINE DISORDER: ICD-10-CM

## 2023-04-24 DIAGNOSIS — Z13.29 ENCOUNTER FOR SCREENING FOR OTHER SUSPECTED ENDOCRINE DISORDER: ICD-10-CM

## 2023-04-24 DIAGNOSIS — Z30.41 ENCOUNTER FOR SURVEILLANCE OF CONTRACEPTIVE PILLS: ICD-10-CM

## 2023-04-24 DIAGNOSIS — Z13.228 ENCOUNTER FOR SCREENING FOR OTHER SUSPECTED ENDOCRINE DISORDER: ICD-10-CM

## 2023-04-24 DIAGNOSIS — N61.1 ABSCESS OF THE BREAST AND NIPPLE: ICD-10-CM

## 2023-04-24 DIAGNOSIS — Z30.09 ENCOUNTER FOR OTHER GENERAL COUNSELING AND ADVICE ON CONTRACEPTION: ICD-10-CM

## 2023-04-24 DIAGNOSIS — N91.1 SECONDARY AMENORRHEA: ICD-10-CM

## 2023-04-24 LAB
ADDITIONAL US: NORMAL
CHROMOSOME13 INTERPRETATION: NORMAL
CHROMOSOME13 TEST RESULT: NORMAL
CHROMOSOME18 INTERPRETATION: NORMAL
CHROMOSOME18 TEST RESULT: NORMAL
CHROMOSOME21 INTERPRETATION: NORMAL
CHROMOSOME21 TEST RESULT: NORMAL
COMMENTS: AFP: NORMAL
CRL SCAN TWIN B: NORMAL
CRL SCAN: NORMAL
CROWN RUMP LENGTH TWIN B: NORMAL
CROWN RUMP LENGTH: 49.7 MM
DOWN SYNDROME AGE RISK: NORMAL
DOWN SYNDROME INTERPRETATION: NORMAL
DOWN SYNDROME SCREENING RISK: NORMAL
FETAL FRACTION: NORMAL
GEST. AGE ON COLLECTION DATE: 11.4 WEEKS
HCG MOM: 0.64
HCG VALUE: 86.2 IU/ML
MATERNAL AGE AT EDD: 37.8 YR
MICRODELETIONS INTERPRETATION: NORMAL
MICRODELETIONS RESULT: NORMAL
NOTE: AFP: NORMAL
NT MOM TWIN B: NORMAL
NT TWIN B: NORMAL
NUCHAL TRANSLUCENCY (NT): 2.4 MM
NUCHAL TRANSLUCENCY MOM: 1.81
NUMBER OF FETUSES: 1
PAPP-A MOM: 0.27
PAPP-A VALUE: 229.7 NG/ML
PERFORMANCE AND LIMITATIONS: NORMAL
RACE: NORMAL
RESULTS AFP: NORMAL
SEX CHROMOSOME INTERPRETATION: NORMAL
SEX CHROMOSOME TEST RESULT: NORMAL
SONOGRAPHER ID#: NORMAL
SUBMIT PART 2 SAMPLE USING: NORMAL
TEST RESULTS: AFP: ABNORMAL
TRISOMY 18 AGE RISK: NORMAL
TRISOMY 18 INTERPRETATION: NORMAL
TRISOMY 18 SCREENING RISK: NORMAL
VERIFI WITH MICRODELETIONS: NOT DETECTED
WEIGHT AFP: 122 LBS

## 2023-04-26 ENCOUNTER — APPOINTMENT (OUTPATIENT)
Dept: MATERNAL FETAL MEDICINE | Facility: CLINIC | Age: 38
End: 2023-04-26
Payer: COMMERCIAL

## 2023-04-26 ENCOUNTER — NON-APPOINTMENT (OUTPATIENT)
Age: 38
End: 2023-04-26

## 2023-04-26 ENCOUNTER — APPOINTMENT (OUTPATIENT)
Dept: ANTEPARTUM | Facility: CLINIC | Age: 38
End: 2023-04-26

## 2023-04-26 VITALS
HEIGHT: 62 IN | HEART RATE: 72 BPM | OXYGEN SATURATION: 98 % | DIASTOLIC BLOOD PRESSURE: 68 MMHG | SYSTOLIC BLOOD PRESSURE: 106 MMHG | BODY MASS INDEX: 23 KG/M2 | WEIGHT: 125 LBS | RESPIRATION RATE: 18 BRPM

## 2023-04-26 DIAGNOSIS — O09.891 SUPERVISION OF OTHER HIGH RISK PREGNANCIES, FIRST TRIMESTER: ICD-10-CM

## 2023-04-26 PROCEDURE — 99205 OFFICE O/P NEW HI 60 MIN: CPT

## 2023-04-26 RX ORDER — VITAMIN C, CALCIUM, IRON, VITAMIN D3, VITAMIN E, VITAMIN B1, VITAMIN B2, VITAMIN B3, VITAMIN B6, FOLIC ACID, IODINE, ZINC, COPPER, DOCUSATE SODIUM, DOCOSAHEXAENOIC ACID (DHA) 27-1-50 MG
KIT ORAL
Refills: 0 | Status: ACTIVE | COMMUNITY

## 2023-04-27 PROBLEM — O09.891 MEDICATION EXPOSURE DURING FIRST TRIMESTER OF PREGNANCY: Status: ACTIVE | Noted: 2023-04-27

## 2023-04-27 NOTE — SURGICAL HISTORY
[Fibroids] : no fibroids [Abn Paps] : abnormal pap smear [Breast Disease] : no breast disease [STI's] : no STI's [Infertility] : no infertility [Cysts] : no cysts [OC Use] : no OC use [Last Pap: ___] : Last Pap: [unfilled]

## 2023-04-27 NOTE — FAMILY HISTORY
[Reported Family History Of Birth Defects] : no congenital heart defects [Edison-Sachs Carrier] : no Edison-Sachs [Family History] : no mental retardation/autism [Reported Family History Of Genetic Disease] : no maternal metabolic disorder

## 2023-04-27 NOTE — PAST MEDICAL HISTORY
[HIV Infection] : no HIV [Exposure To Gonorrhea] : no gonorrhea [Chlamydial Infections] : no chlamydia [Syphilis] : no syphilis [Hepatitis, B Virus] : no Hepatitis B [Human Papilloma Virus Infection] : no genital warts [Hepatitis, C Virus] : no Hepatitis C [Trichomoniasis] : no trichomoniasis

## 2023-04-27 NOTE — DISCUSSION/SUMMARY
[FreeTextEntry1] : We had the pleasure of seeing your patient for a Maternal-Fetal Medicine consultation today. She is a 38 year-old  at 13 weeks of gestation with a pregnancy complicated by the below issues.\par \par She was extensively counseled regarding the following issues:\par \par History of spontaneous  birth (34 week PPROM, vaginal bleeding at 33 weeks): One of the strongest clinical risk factors for  birth is a prior  birth. For the current pregnancy, I recommend cervical length surveillance with transvaginal ultrasound every 2 weeks from 16 weeks until 24 weeks. A short cervical length (less than 25 mm) before 24 weeks of gestation in an asymptomatic patient with a history of spontaneous  birth is an indication for cerclage placement. Cervical cerclage in this population is associated with significant pregnancy prolongation and improved  birth outcomes. Progesterone is not indicated. Serial cervical length surveillance should start in 3 weeks. Terese's history of vaginal bleeding preceding her prior PPROM is suspicious for placental abruption. Therefore, weekly fetal testing is suggested starting at 32 weeks.\par \par History of cervical surgery (LEEP vs. ablation?): Some studies have noted an association between cervical surgery and increased risk for late miscarriage and  birth. However, this risk is largely thought to apply to women who have had large portions of the cervix removed. In those cases, possible mechanisms include loss of tensile strength from loss of cervical stroma, increased susceptibility for infection from loss of cervical glands, and loss of cervical plasticity from cervical scarring. Nevertheless, most women who have undergone cervical surgery have uneventful pregnancies and require only routine prenatal care. \par \par Advanced Maternal Age (AMA); abnormal first trimester serum screening; low risk NIPS: Women who are of advanced maternal age (typically defined as age 35 at delivery) are at an increased risk for fetal aneuploidy, spontaneous , congenital malformations, diabetes, hypertensive disorders of pregnancy,  delivery, stillbirth, and low birth weight neonates. Genetic counseling is available, if desired, to review and discuss invasive and non-invasive options to detect aneuploidy. These options include: first trimester risk-assessment, sequential screening, non-invasive prenatal screening (NIPS), amniocentesis, and anatomical ultrasound at 20 weeks. I explained to the patient that an increased risk for T18 and T21 on 1st trimester screening is almost certainly a false positive in the setting of a low risk NIPS. She declined diagnostic testing.\par \par Low RAFAEL-A: This finding is associated with an increased risk for third trimester pregnancy complications such as fetal growth restriction and/or preeclampsia. Serial fetal growth studies with Doppler of the umbilical artery, as well as close maternal surveillance for signs/symptoms or preeclampsia are advised. Daily low dose aspirin is recommended starting at 12-16 weeks to reduce the risk of preeclampsia (see discussion re: aspirin and proctitis below).\par \par Proctitis; medication exposure (mesalamine): The course of inflammatory conditions affecting the bowel and rectum during pregnancy is determined in part by the activity of the disease at conception. Patients in remission at the time of conception are likely to remain in remission during pregnancy. In contrast, most patients with active disease at conception are likely to have continued or worsening symptoms during pregnancy. Terese recently had a flare up of her condition. She is followed by a gastroenterologist and has a follow-up appointment on 5/10/23. Terese is concerned about taking aspirin due to her proctitis. I explained that low dose aspirin likely would not have a detrimental effect on her proctitis, and would lower her risk of preeclampsia, and she should discuss further with her gastroenterologist and obstetrician. The choice of anti-inflammatory and immunosuppressive medications during pregnancy and lactation should be based upon their relative safety and indications as well as the risk of relapse if the medication(s) are discontinued. Terese is currently taking mesalamine which is a 5-aminosalicylic acid (5-ASA) drug. This medication can be used during pregnancy and lactation; it is not believed to be associated with birth defects. 5-ASAs are excreted in breast milk in low concentrations. Infants whose mothers are on 5-ASAs should be monitored for the development of diarrhea, a rare complication of breastfeeding. Serial growth ultrasounds are suggested every 4 weeks.\par \par \par Thank you for requesting a consultation on this patient. The total time spent in preparation for this visit, medical history taking, orders, review of records, counseling the patient, and writing this note was 60 minutes.\par \par At the end of our discussion, the patient indicated that her questions were answered and she seemed satisfied with our discussion. Please do not hesitate to contact us with any questions.\par \par Sincerely,\par \par \par Tai Paniagua MD, SONIA\par Attending Physician, Maternal-Fetal Medicine\par

## 2023-04-27 NOTE — ACTIVE PROBLEMS
[Diabetes Mellitus] : no diabetes mellitus [Hypertension] : no hypertension [Heart Disease] : no heart disease [Autoimmune Disease] : no autoimmune disease [Renal Disease] : no kidney disease, no UTI [Neurologic Disorder] : no neurologic disorder, no epilepsy [Depression] : no depression, no post partum depression [Psychiatric Disorders] : no psychiatric disorders [Hepatic Disorder] : no hepatitis, no liver disease [Thrombophlebitis] : no varicosities, no phlebitis [Trauma] : no trauma/violence [Thyroid Disorder] : no thyroid dysfunction [Blood Transfusion (___ Ml)] : no history of blood transfusion

## 2023-04-27 NOTE — VITALS
[LMP (date): ___] : LMP was on [unfilled] [GA =___ Weeks] : which calculates to a GA of [unfilled] weeks [SOTERO by LMP (date): ___] : The calculated SOTERO by LMP is [unfilled] [By LMP] : this is the final SOTERO

## 2023-04-27 NOTE — OB HISTORY
[Pregnancy History] : boy [___] : pregnancy complications occured [LMP: ___] : LMP: [unfilled] [SOTERO: ___] : SOTERO: [unfilled] [EGA: ___ wks] : EGA: [unfilled] wks [Spontaneous] : Spontaneous conception [Sonogram] : sonogram [at ___ wks] : at [unfilled] weeks [FreeTextEntry1] : [facility] to discuss AMA, proctitis flare up and history of  delivery and LEEP. Pt is AMA- 37 y/o- FTS showed inc. RT21/T18 and low PAPPA @ 0.27 MoM. Pt has NIPS-LR and no GC to date. Pt was dx with proctitis in  and has been followed by GI (Dr. Quinones) yearly. Pt has current flare up and saw GI 3/3/23. Pt was started on Mesalamine 1.2 grams 4x daily and Mesalamine 1000mg suppository HS. Pt has f/u 5/10/23. Pt had hx of HPV years ago and has had a procedure to burn the abnormal cells in . Pt unsure if LEEP procedure. Pt has had normal PAPs for the past few years. Pt had PTD at 34 weeks due to PPROM. Pt denies ctx/cramping/ vag. bleeding/leaking.  [Definite:  ___ (Date)] : the last menstrual period was [unfilled] [Normal Amount/Duration] : was of a normal amount and duration [Spotting Between  Menses] : no spotting between menses [Regular Cycle Intervals] : periods have been regular [Menstrual Cramps] : no menstrual cramps [On BCP at conception] : the patient was not on BCP at conception

## 2023-05-10 ENCOUNTER — NON-APPOINTMENT (OUTPATIENT)
Age: 38
End: 2023-05-10

## 2023-05-10 ENCOUNTER — APPOINTMENT (OUTPATIENT)
Dept: OBGYN | Facility: CLINIC | Age: 38
End: 2023-05-10
Payer: COMMERCIAL

## 2023-05-10 VITALS — SYSTOLIC BLOOD PRESSURE: 104 MMHG | BODY MASS INDEX: 22.5 KG/M2 | DIASTOLIC BLOOD PRESSURE: 62 MMHG | WEIGHT: 123 LBS

## 2023-05-10 PROCEDURE — 0502F SUBSEQUENT PRENATAL CARE: CPT

## 2023-05-10 PROCEDURE — 81003 URINALYSIS AUTO W/O SCOPE: CPT | Mod: QW

## 2023-05-15 ENCOUNTER — NON-APPOINTMENT (OUTPATIENT)
Age: 38
End: 2023-05-15

## 2023-05-15 ENCOUNTER — APPOINTMENT (OUTPATIENT)
Dept: OBGYN | Facility: CLINIC | Age: 38
End: 2023-05-15
Payer: COMMERCIAL

## 2023-05-15 VITALS
SYSTOLIC BLOOD PRESSURE: 108 MMHG | DIASTOLIC BLOOD PRESSURE: 64 MMHG | HEIGHT: 62 IN | WEIGHT: 128 LBS | BODY MASS INDEX: 23.55 KG/M2

## 2023-05-15 LAB
BILIRUB UR QL STRIP: NORMAL
CLARITY UR: CLEAR
COLLECTION METHOD: NORMAL
GLUCOSE UR-MCNC: 100
HCG UR QL: 0.2 EU/DL
HGB UR QL STRIP.AUTO: NORMAL
KETONES UR-MCNC: NORMAL
LEUKOCYTE ESTERASE UR QL STRIP: NORMAL
NITRITE UR QL STRIP: NORMAL
PH UR STRIP: 5.5
PROT UR STRIP-MCNC: NORMAL
SP GR UR STRIP: 1.02

## 2023-05-15 PROCEDURE — 0502F SUBSEQUENT PRENATAL CARE: CPT

## 2023-05-15 PROCEDURE — 36415 COLL VENOUS BLD VENIPUNCTURE: CPT

## 2023-05-18 ENCOUNTER — ASOB RESULT (OUTPATIENT)
Age: 38
End: 2023-05-18

## 2023-05-18 ENCOUNTER — APPOINTMENT (OUTPATIENT)
Dept: ANTEPARTUM | Facility: CLINIC | Age: 38
End: 2023-05-18
Payer: COMMERCIAL

## 2023-05-18 PROCEDURE — 76817 TRANSVAGINAL US OBSTETRIC: CPT

## 2023-05-18 PROCEDURE — 76815 OB US LIMITED FETUS(S): CPT

## 2023-05-22 LAB
ADDITIONAL US: NORMAL
AFP MOM: 1.01
AFP VALUE: 33.8 NG/ML
COLLECTED ON 2: NORMAL
COLLECTED ON: NORMAL
CRL SCAN TWIN B: NORMAL
CRL SCAN: NORMAL
CROWN RUMP LENGTH TWIN B: NORMAL
CROWN RUMP LENGTH: 49.7 MM
DIA MOM: 0.69
DIA VALUE: 127.4 PG/ML
DOWN SYNDROME AGE RISK: NORMAL
DOWN SYNDROME INTERPRETATION: NORMAL
DOWN SYNDROME SCREENING RISK: NORMAL
FIRST TRIMESTER SAMPLE: NORMAL
GEST. AGE ON COLLECTION DATE: 11.4 WEEKS
GESTATIONAL AGE: 15.3 WEEKS
HCG MOM: 0.55
HCG VALUE: 28 IU/ML
INSULIN DEP DIABETES: NO
MATERNAL AGE AT EDD: 37.8 YR
NT MOM TWIN B: NORMAL
NT TWIN B: NORMAL
NUCHAL TRANSLUCENCY (NT): 2.4 MM
NUCHAL TRANSLUCENCY MOM: 1.81
NUMBER OF FETUSES: 1
OPEN SPINA BIFIDA: NORMAL
OSB INTERPRETATION: NORMAL
PAPP-A MOM: 0.27
PAPP-A VALUE: 229.7 NG/ML
RACE: NORMAL
SECOND TRIMESTER SAMPLE: NORMAL
SEQUENTIAL 2 COMMENTS: NORMAL
SEQUENTIAL 2 NOTE: NORMAL
SEQUENTIAL 2 RESULTS: NORMAL
SEQUENTIAL 2 TEST RESULTS: NORMAL
SONOGRAPHER ID#: NORMAL
TRISOMY 18 AGE RISK: NORMAL
TRISOMY 18 INTERPRETATION: NORMAL
TRISOMY 18 SCREENING RISK: NORMAL
UE3 MOM: 0.91
UE3 VALUE: 0.75 NG/ML
WEIGHT AFP: 122 LBS
WEIGHT: 123 LBS

## 2023-06-01 ENCOUNTER — APPOINTMENT (OUTPATIENT)
Dept: ANTEPARTUM | Facility: CLINIC | Age: 38
End: 2023-06-01
Payer: COMMERCIAL

## 2023-06-01 ENCOUNTER — ASOB RESULT (OUTPATIENT)
Age: 38
End: 2023-06-01

## 2023-06-01 PROCEDURE — 76817 TRANSVAGINAL US OBSTETRIC: CPT

## 2023-06-13 ENCOUNTER — APPOINTMENT (OUTPATIENT)
Dept: ANTEPARTUM | Facility: CLINIC | Age: 38
End: 2023-06-13
Payer: COMMERCIAL

## 2023-06-13 ENCOUNTER — ASOB RESULT (OUTPATIENT)
Age: 38
End: 2023-06-13

## 2023-06-13 PROCEDURE — 76811 OB US DETAILED SNGL FETUS: CPT

## 2023-06-13 PROCEDURE — 76817 TRANSVAGINAL US OBSTETRIC: CPT

## 2023-06-14 ENCOUNTER — NON-APPOINTMENT (OUTPATIENT)
Age: 38
End: 2023-06-14

## 2023-06-15 ENCOUNTER — APPOINTMENT (OUTPATIENT)
Dept: ANTEPARTUM | Facility: CLINIC | Age: 38
End: 2023-06-15

## 2023-06-18 ENCOUNTER — OUTPATIENT (OUTPATIENT)
Dept: INPATIENT UNIT | Facility: HOSPITAL | Age: 38
LOS: 1 days | Discharge: HOME | End: 2023-06-18
Payer: COMMERCIAL

## 2023-06-18 ENCOUNTER — RESULT REVIEW (OUTPATIENT)
Age: 38
End: 2023-06-18

## 2023-06-18 DIAGNOSIS — Z3A.00 WEEKS OF GESTATION OF PREGNANCY NOT SPECIFIED: ICD-10-CM

## 2023-06-18 DIAGNOSIS — O26.899 OTHER SPECIFIED PREGNANCY RELATED CONDITIONS, UNSPECIFIED TRIMESTER: ICD-10-CM

## 2023-06-18 LAB
APPEARANCE UR: CLEAR — SIGNIFICANT CHANGE UP
BILIRUB UR-MCNC: NEGATIVE — SIGNIFICANT CHANGE UP
COLOR SPEC: SIGNIFICANT CHANGE UP
DIFF PNL FLD: ABNORMAL
GLUCOSE UR QL: NEGATIVE — SIGNIFICANT CHANGE UP
HCT VFR BLD CALC: 32.7 % — LOW (ref 34.5–45)
HGB BLD-MCNC: 11.6 G/DL — SIGNIFICANT CHANGE UP (ref 11.5–15.5)
KETONES UR-MCNC: NEGATIVE — SIGNIFICANT CHANGE UP
LEUKOCYTE ESTERASE UR-ACNC: NEGATIVE — SIGNIFICANT CHANGE UP
MCHC RBC-ENTMCNC: 31.4 PG — SIGNIFICANT CHANGE UP (ref 27–34)
MCHC RBC-ENTMCNC: 35.5 GM/DL — SIGNIFICANT CHANGE UP (ref 32–36)
MCV RBC AUTO: 88.4 FL — SIGNIFICANT CHANGE UP (ref 80–100)
NITRITE UR-MCNC: NEGATIVE — SIGNIFICANT CHANGE UP
PH UR: 7 — SIGNIFICANT CHANGE UP (ref 5–8)
PLATELET # BLD AUTO: 243 K/UL — SIGNIFICANT CHANGE UP (ref 150–400)
PROT UR-MCNC: NEGATIVE — SIGNIFICANT CHANGE UP
RBC # BLD: 3.7 M/UL — LOW (ref 3.8–5.2)
RBC # FLD: 12.1 % — SIGNIFICANT CHANGE UP (ref 10.3–14.5)
SP GR SPEC: 1.01 — SIGNIFICANT CHANGE UP (ref 1.01–1.02)
UROBILINOGEN FLD QL: NEGATIVE — SIGNIFICANT CHANGE UP
WBC # BLD: 7.75 K/UL — SIGNIFICANT CHANGE UP (ref 3.8–10.5)
WBC # FLD AUTO: 7.75 K/UL — SIGNIFICANT CHANGE UP (ref 3.8–10.5)

## 2023-06-18 PROCEDURE — 81001 URINALYSIS AUTO W/SCOPE: CPT

## 2023-06-18 PROCEDURE — 99214 OFFICE O/P EST MOD 30 MIN: CPT

## 2023-06-18 PROCEDURE — 76817 TRANSVAGINAL US OBSTETRIC: CPT | Mod: 26

## 2023-06-18 PROCEDURE — 76770 US EXAM ABDO BACK WALL COMP: CPT

## 2023-06-18 PROCEDURE — 36415 COLL VENOUS BLD VENIPUNCTURE: CPT

## 2023-06-18 PROCEDURE — 85027 COMPLETE CBC AUTOMATED: CPT

## 2023-06-18 PROCEDURE — 96360 HYDRATION IV INFUSION INIT: CPT

## 2023-06-18 PROCEDURE — 76770 US EXAM ABDO BACK WALL COMP: CPT | Mod: 26

## 2023-06-18 PROCEDURE — 76817 TRANSVAGINAL US OBSTETRIC: CPT

## 2023-06-18 RX ORDER — SODIUM CHLORIDE 9 MG/ML
1000 INJECTION, SOLUTION INTRAVENOUS ONCE
Refills: 0 | Status: COMPLETED | OUTPATIENT
Start: 2023-06-18 | End: 2023-06-18

## 2023-06-18 RX ADMIN — SODIUM CHLORIDE 1000 MILLILITER(S): 9 INJECTION, SOLUTION INTRAVENOUS at 15:00

## 2023-06-19 ENCOUNTER — APPOINTMENT (OUTPATIENT)
Dept: OBGYN | Facility: CLINIC | Age: 38
End: 2023-06-19
Payer: COMMERCIAL

## 2023-06-19 VITALS
DIASTOLIC BLOOD PRESSURE: 58 MMHG | WEIGHT: 132 LBS | SYSTOLIC BLOOD PRESSURE: 106 MMHG | BODY MASS INDEX: 24.29 KG/M2 | HEIGHT: 62 IN

## 2023-06-19 DIAGNOSIS — O26.899 OTHER SPECIFIED PREGNANCY RELATED CONDITIONS, UNSPECIFIED TRIMESTER: ICD-10-CM

## 2023-06-19 DIAGNOSIS — Z3A.00 WEEKS OF GESTATION OF PREGNANCY NOT SPECIFIED: ICD-10-CM

## 2023-06-19 LAB
BILIRUB UR QL STRIP: NORMAL
CLARITY UR: CLEAR
COLLECTION METHOD: NORMAL
GLUCOSE UR-MCNC: NORMAL
HCG UR QL: 0.2 EU/DL
HGB UR QL STRIP.AUTO: NORMAL
KETONES UR-MCNC: NORMAL
LEUKOCYTE ESTERASE UR QL STRIP: NORMAL
NITRITE UR QL STRIP: NORMAL
PH UR STRIP: 5.5
PROT UR STRIP-MCNC: NORMAL
SP GR UR STRIP: 1.01

## 2023-06-19 PROCEDURE — 0502F SUBSEQUENT PRENATAL CARE: CPT

## 2023-06-21 ENCOUNTER — APPOINTMENT (OUTPATIENT)
Dept: ANTEPARTUM | Facility: CLINIC | Age: 38
End: 2023-06-21

## 2023-06-21 ENCOUNTER — APPOINTMENT (OUTPATIENT)
Dept: MATERNAL FETAL MEDICINE | Facility: CLINIC | Age: 38
End: 2023-06-21
Payer: COMMERCIAL

## 2023-06-21 VITALS
RESPIRATION RATE: 18 BRPM | HEIGHT: 62 IN | SYSTOLIC BLOOD PRESSURE: 112 MMHG | BODY MASS INDEX: 24.48 KG/M2 | HEART RATE: 78 BPM | DIASTOLIC BLOOD PRESSURE: 68 MMHG | WEIGHT: 133 LBS | OXYGEN SATURATION: 98 %

## 2023-06-21 DIAGNOSIS — Z98.890 MATERNAL CARE FOR OTHER ABNORMALITIES OF CERVIX, UNSPECIFIED TRIMESTER: ICD-10-CM

## 2023-06-21 DIAGNOSIS — Z3A.13 13 WEEKS GESTATION OF PREGNANCY: ICD-10-CM

## 2023-06-21 DIAGNOSIS — O34.40 MATERNAL CARE FOR OTHER ABNORMALITIES OF CERVIX, UNSPECIFIED TRIMESTER: ICD-10-CM

## 2023-06-21 DIAGNOSIS — O09.521 SUPERVISION OF ELDERLY MULTIGRAVIDA, FIRST TRIMESTER: ICD-10-CM

## 2023-06-21 DIAGNOSIS — Z34.92 ENCOUNTER FOR SUPERVISION OF NORMAL PREGNANCY, UNSPECIFIED, SECOND TRIMESTER: ICD-10-CM

## 2023-06-21 DIAGNOSIS — O28.0 ABNORMAL HEMATOLOGICAL FINDING ON ANTENATAL SCREENING OF MOTHER: ICD-10-CM

## 2023-06-21 DIAGNOSIS — O09.299 SUPERVISION OF PREGNANCY WITH OTHER POOR REPRODUCTIVE OR OBSTETRIC HISTORY, UNSPECIFIED TRIMESTER: ICD-10-CM

## 2023-06-21 DIAGNOSIS — Z87.59 PERSONAL HISTORY OF OTHER COMPLICATIONS OF PREGNANCY, CHILDBIRTH AND THE PUERPERIUM: ICD-10-CM

## 2023-06-21 DIAGNOSIS — K62.89 OTHER SPECIFIED DISEASES OF ANUS AND RECTUM: ICD-10-CM

## 2023-06-21 DIAGNOSIS — Z34.82 ENCOUNTER FOR SUPERVISION OF OTHER NORMAL PREGNANCY, SECOND TRIMESTER: ICD-10-CM

## 2023-06-21 DIAGNOSIS — O09.899 ABNORMAL HEMATOLOGICAL FINDING ON ANTENATAL SCREENING OF MOTHER: ICD-10-CM

## 2023-06-21 PROCEDURE — 99214 OFFICE O/P EST MOD 30 MIN: CPT

## 2023-06-21 NOTE — FAMILY HISTORY
[Reported Family History Of Birth Defects] : no congenital heart defects [Edison-Sachs Carrier] : no Edison-Sachs [Family History] : no mental retardation/autism [Reported Family History Of Genetic Disease] : no history of child defect in child of baby father

## 2023-06-21 NOTE — VITALS
[LMP (date): ___] : LMP was on [unfilled] [GA =___ Weeks] : which calculates to a GA of [unfilled] weeks [SOTERO by LMP (date): ___] : The calculated SOTERO by LMP is [unfilled] [By LMP] : this is the final STOERO

## 2023-06-22 NOTE — DISCUSSION/SUMMARY
[FreeTextEntry1] : 37 y/o  at 21 weeks and 0 days with History of  Delivery, History of LEEP procedure, AMA, Low RAFAEL-A, Proctitis, and recent Pelvic Cramping.\par \par 1) Pelvic Cramping - She had pelvic cramping a few days ago and went to [facility].  Her cervical length at that time was 3.5 cm.  She is scheduled for repeat cervical length in 1 week.  Her cramping is improved today.  Signs and symptoms of  labor were reviewed with her today.  She understands the importance of ongoing cervical length checks.  \par \par 2)  History of Spontaneous  Birth (34 week PPROM, vaginal bleeding at 33 weeks) - One of the strongest clinical risk factors for  birth is a prior  birth. For the current pregnancy, we recommend continued cervical length surveillance with transvaginal ultrasound every 2 weeks from 16 weeks until 24 weeks. A short cervical length (less than 25 mm) before 24 weeks of gestation in an asymptomatic patient with a history of spontaneous  birth is an indication for cerclage placement. Cervical cerclage in this population is associated with significant pregnancy prolongation and improved  birth outcomes. Progesterone is not indicated.  Terese's history of vaginal bleeding preceding her prior PPROM is suspicious for placental abruption.  Weekly fetal testing is suggested starting at 32 weeks.\par \par 3)  History of Cervical Surgery (LEEP vs. Ablation?) - Some studies have noted an association between cervical surgery and increased risk for late miscarriage and  birth. However, this risk is largely thought to apply to women who have had large portions of the cervix removed. In those cases, possible mechanisms include loss of tensile strength from loss of cervical stroma, increased susceptibility for infection from loss of cervical glands, and loss of cervical plasticity from cervical scarring. Nevertheless, most women who have undergone cervical surgery have uneventful pregnancies and require only routine prenatal care. \par \par 3)  Advanced Maternal Age (AMA) - Abnormal first trimester serum screening; low risk NIPS: Women who are of advanced maternal age (typically defined as age 35 at delivery) are at an increased risk for fetal aneuploidy, spontaneous , congenital malformations, diabetes, hypertensive disorders of pregnancy,  delivery, stillbirth, and low birth weight neonates. Genetic counseling is available, if desired, to review and discuss invasive and non-invasive options to detect aneuploidy. These options include: first trimester risk-assessment, sequential screening, non-invasive prenatal screening (NIPS), amniocentesis, and anatomical ultrasound at 20 weeks.  She declines diagnostic testing.\par \par 4)  Low RAFAEL-A - This finding is associated with an increased risk for third trimester pregnancy complications such as fetal growth restriction and/or preeclampsia. Serial fetal growth studies with Doppler of the umbilical artery, as well as close maternal surveillance for signs/symptoms or preeclampsia are advised.  We recommend continuing aspirin 81 mg daily for pre-eclampsia prophylaxis.\par \par 5)  Proctitis - medication exposure (mesalamine) - The course of inflammatory conditions affecting the bowel and rectum during pregnancy is determined in part by the activity of the disease at conception. Patients in remission at the time of conception are likely to remain in remission during pregnancy.  The choice of anti-inflammatory and immunosuppressive medications during pregnancy and lactation should be based upon their relative safety and indications as well as the risk of relapse if the medication(s) are discontinued. Terese is currently taking mesalamine which is a 5-aminosalicylic acid (5-ASA) drug. This medication can be used during pregnancy and lactation; it is not believed to be associated with birth defects. 5-ASAs are excreted in breast milk in low concentrations. Infants whose mothers are on 5-ASAs should be monitored for the development of diarrhea, a rare complication of breastfeeding. Serial growth ultrasounds are suggested every 4 weeks.\par \par \par Thank you for requesting a consultation on this patient. The total time spent in preparation for this visit, medical history taking, orders, review of records, counseling the patient, and writing this note was 40 minutes.\par \par At the end of our discussion, the patient indicated that her questions were answered and she seemed satisfied with our discussion. Please do not hesitate to contact us with any questions.\par \par Sincerely,\par \par Alec Frederick MD\par Maternal Fetal Medicine Fellow\par \par \par Tai Paniagua MD, SONIA\par Maternal-Fetal Medicine Attending\par

## 2023-06-22 NOTE — REASON FOR VISIT
[Nursing Assessment] : Nursing Assessment [FreeTextEntry2] : [facility] due to recent hospital visit due to cramping/dizziness.  Pt was at [facility] on 6/18/2023 for about 5 hours.  Pt denies any leaking or bleeding currently or previously.  Pt states she still gets some tightening on the belly but feels better than on Sunday.  Pt denies any dizziness currently. Pt states she was found to have some blood in her urine.  Pt states her kidneys were wnl and no cervical change.  Pt is currently taking a baby ASA, mesalamine 4.2 grams daily for her proctitis.  Pt is followed by GI and is feeling well.  Pt has hx of PTD, AMA and hx of LEEP which she was seen previously for on 4/26/2023.  Pt states she has some questions for the doctor.  Pt has a follow up growth on 8/7/2023. FHR confirmed which was 140 bpm.

## 2023-06-22 NOTE — SURGICAL HISTORY
[Abn Paps] : abnormal pap smear [Last Pap: ___] : Last Pap: [unfilled] [Fibroids] : no fibroids [Breast Disease] : no breast disease [STI's] : no STI's [Infertility] : no infertility [Cysts] : no cysts [OC Use] : no OC use

## 2023-06-22 NOTE — CHIEF COMPLAINT
[G ___] : G [unfilled] [P ___] : P [unfilled] [de-identified] : AMA, Proctitis, History of  Delivery, History of LEEP, Recent Pelvic Cramping

## 2023-06-22 NOTE — OB HISTORY
[Pregnancy History] : boy [___] : pregnancy complications occured [LMP: ___] : LMP: [unfilled] [SOTERO: ___] : SOTERO: [unfilled] [EGA: ___ wks] : EGA: [unfilled] wks [Spontaneous] : Spontaneous conception [Sonogram] : sonogram [at ___ wks] : at [unfilled] weeks [Definite:  ___ (Date)] : the last menstrual period was [unfilled] [Normal Amount/Duration] : was of a normal amount and duration [Regular Cycle Intervals] : periods have been regular [FreeTextEntry1] : 39 y/o  presenting today for a return Maternal Fetal Medicine visit due to cramping 3 days previously.  She reports she felt cramping on  and went to [facility] at that time.  A cervical length was performed at [place] and her cervix was found to be 3.5 cm in length.  She denies bleeding or leakage of fluid.  She previously had a maternal fetal medicine consultation with Dr. Paniagua on  to discuss AMA, proctitis, history of  delivery and LEEP.  FTS showed increased risk of T21/T18 and low PAPPA @ 0.27 MoM.  She had low risk NIPS.  She was diagnosed with proctitis in  and has been followed by GI (Dr. Quinones) yearly.  Her last flare was on 3/3/23.  She was started on Mesalamine 1.2 grams 4x daily and Mesalamine 1000 mg suppository nightly.  She also has previously had a LEEP procedure.  She had a  delivery at 34 weeks gestation after PPROM. She currently denies bleeding or leakage of fluid.  Her cramping has improved since 23.  [Spotting Between  Menses] : no spotting between menses [Menstrual Cramps] : no menstrual cramps [On BCP at conception] : the patient was not on BCP at conception

## 2023-06-28 ENCOUNTER — APPOINTMENT (OUTPATIENT)
Dept: ANTEPARTUM | Facility: CLINIC | Age: 38
End: 2023-06-28
Payer: COMMERCIAL

## 2023-06-28 ENCOUNTER — ASOB RESULT (OUTPATIENT)
Age: 38
End: 2023-06-28

## 2023-06-28 PROCEDURE — 76817 TRANSVAGINAL US OBSTETRIC: CPT

## 2023-07-11 ENCOUNTER — NON-APPOINTMENT (OUTPATIENT)
Age: 38
End: 2023-07-11

## 2023-07-12 ENCOUNTER — APPOINTMENT (OUTPATIENT)
Dept: OBGYN | Facility: CLINIC | Age: 38
End: 2023-07-12
Payer: COMMERCIAL

## 2023-07-12 VITALS
DIASTOLIC BLOOD PRESSURE: 66 MMHG | HEIGHT: 62 IN | BODY MASS INDEX: 24.84 KG/M2 | SYSTOLIC BLOOD PRESSURE: 112 MMHG | WEIGHT: 135 LBS

## 2023-07-12 PROCEDURE — 0502F SUBSEQUENT PRENATAL CARE: CPT

## 2023-07-13 ENCOUNTER — APPOINTMENT (OUTPATIENT)
Dept: ANTEPARTUM | Facility: CLINIC | Age: 38
End: 2023-07-13
Payer: COMMERCIAL

## 2023-07-13 ENCOUNTER — ASOB RESULT (OUTPATIENT)
Age: 38
End: 2023-07-13

## 2023-07-13 LAB
BILIRUB UR QL STRIP: NORMAL
CLARITY UR: NORMAL
COLLECTION METHOD: NORMAL
GLUCOSE UR-MCNC: NORMAL
HCG UR QL: 0.2 EU/DL
HGB UR QL STRIP.AUTO: NORMAL
KETONES UR-MCNC: NORMAL
LEUKOCYTE ESTERASE UR QL STRIP: NORMAL
NITRITE UR QL STRIP: NORMAL
PH UR STRIP: 7
PROT UR STRIP-MCNC: NORMAL
SP GR UR STRIP: 1.02

## 2023-07-13 PROCEDURE — 76817 TRANSVAGINAL US OBSTETRIC: CPT

## 2023-07-31 ENCOUNTER — NON-APPOINTMENT (OUTPATIENT)
Age: 38
End: 2023-07-31

## 2023-08-01 ENCOUNTER — APPOINTMENT (OUTPATIENT)
Dept: OBGYN | Facility: CLINIC | Age: 38
End: 2023-08-01
Payer: COMMERCIAL

## 2023-08-01 VITALS
HEIGHT: 62 IN | WEIGHT: 137 LBS | SYSTOLIC BLOOD PRESSURE: 98 MMHG | BODY MASS INDEX: 25.21 KG/M2 | DIASTOLIC BLOOD PRESSURE: 60 MMHG

## 2023-08-01 LAB
BILIRUB UR QL STRIP: NORMAL
CLARITY UR: CLEAR
COLLECTION METHOD: NORMAL
GLUCOSE UR-MCNC: NORMAL
HCG UR QL: 0.2 EU/DL
HGB UR QL STRIP.AUTO: NORMAL
KETONES UR-MCNC: NORMAL
LEUKOCYTE ESTERASE UR QL STRIP: NORMAL
NITRITE UR QL STRIP: NORMAL
PH UR STRIP: 6.5
PROT UR STRIP-MCNC: NORMAL
SP GR UR STRIP: 1.02

## 2023-08-01 PROCEDURE — 0502F SUBSEQUENT PRENATAL CARE: CPT

## 2023-08-01 PROCEDURE — 36415 COLL VENOUS BLD VENIPUNCTURE: CPT

## 2023-08-07 ENCOUNTER — ASOB RESULT (OUTPATIENT)
Age: 38
End: 2023-08-07

## 2023-08-07 ENCOUNTER — NON-APPOINTMENT (OUTPATIENT)
Age: 38
End: 2023-08-07

## 2023-08-07 ENCOUNTER — APPOINTMENT (OUTPATIENT)
Dept: ANTEPARTUM | Facility: CLINIC | Age: 38
End: 2023-08-07
Payer: COMMERCIAL

## 2023-08-07 PROCEDURE — 76816 OB US FOLLOW-UP PER FETUS: CPT

## 2023-08-09 ENCOUNTER — APPOINTMENT (OUTPATIENT)
Dept: OBGYN | Facility: CLINIC | Age: 38
End: 2023-08-09

## 2023-08-22 ENCOUNTER — TRANSCRIPTION ENCOUNTER (OUTPATIENT)
Age: 38
End: 2023-08-22

## 2023-08-23 ENCOUNTER — APPOINTMENT (OUTPATIENT)
Dept: OBGYN | Facility: CLINIC | Age: 38
End: 2023-08-23
Payer: COMMERCIAL

## 2023-08-23 VITALS — DIASTOLIC BLOOD PRESSURE: 62 MMHG | SYSTOLIC BLOOD PRESSURE: 106 MMHG | WEIGHT: 140 LBS | BODY MASS INDEX: 25.61 KG/M2

## 2023-08-23 DIAGNOSIS — Z23 ENCOUNTER FOR IMMUNIZATION: ICD-10-CM

## 2023-08-23 LAB
HCT VFR BLD CALC: 35.2 %
HGB BLD-MCNC: 11.5 G/DL
MCHC RBC-ENTMCNC: 31.1 PG
MCHC RBC-ENTMCNC: 32.7 GM/DL
MCV RBC AUTO: 95.1 FL
PLATELET # BLD AUTO: 230 K/UL
RBC # BLD: 3.7 M/UL
RBC # FLD: 13.2 %
WBC # FLD AUTO: 6.25 K/UL

## 2023-08-23 PROCEDURE — 90471 IMMUNIZATION ADMIN: CPT

## 2023-08-23 PROCEDURE — 0502F SUBSEQUENT PRENATAL CARE: CPT

## 2023-08-23 PROCEDURE — 90715 TDAP VACCINE 7 YRS/> IM: CPT

## 2023-08-24 LAB
BILIRUB UR QL STRIP: NORMAL
GLUCOSE UR-MCNC: NORMAL
HCG UR QL: 0.2 EU/DL
HGB UR QL STRIP.AUTO: NORMAL
KETONES UR-MCNC: NORMAL
LEUKOCYTE ESTERASE UR QL STRIP: NORMAL
NITRITE UR QL STRIP: NORMAL
PH UR STRIP: 7
PROT UR STRIP-MCNC: NORMAL
SP GR UR STRIP: 1.01

## 2023-08-25 ENCOUNTER — APPOINTMENT (OUTPATIENT)
Dept: OBGYN | Facility: CLINIC | Age: 38
End: 2023-08-25
Payer: COMMERCIAL

## 2023-08-25 VITALS
BODY MASS INDEX: 25.76 KG/M2 | WEIGHT: 140 LBS | DIASTOLIC BLOOD PRESSURE: 66 MMHG | HEIGHT: 62 IN | SYSTOLIC BLOOD PRESSURE: 108 MMHG

## 2023-08-25 DIAGNOSIS — O26.899 OTHER SPECIFIED PREGNANCY RELATED CONDITIONS, UNSPECIFIED TRIMESTER: ICD-10-CM

## 2023-08-25 DIAGNOSIS — R10.9 OTHER SPECIFIED PREGNANCY RELATED CONDITIONS, UNSPECIFIED TRIMESTER: ICD-10-CM

## 2023-08-25 PROCEDURE — 0502F SUBSEQUENT PRENATAL CARE: CPT

## 2023-08-28 ENCOUNTER — NON-APPOINTMENT (OUTPATIENT)
Age: 38
End: 2023-08-28

## 2023-08-28 ENCOUNTER — TRANSCRIPTION ENCOUNTER (OUTPATIENT)
Age: 38
End: 2023-08-28

## 2023-08-31 LAB
FIBRONECTIN PLAS-MCNC: NEGATIVE
GLUCOSE 1H P 50 G GLC PO SERPL-MCNC: 71 MG/DL

## 2023-09-08 ENCOUNTER — NON-APPOINTMENT (OUTPATIENT)
Age: 38
End: 2023-09-08

## 2023-09-11 ENCOUNTER — APPOINTMENT (OUTPATIENT)
Dept: OBGYN | Facility: CLINIC | Age: 38
End: 2023-09-11
Payer: COMMERCIAL

## 2023-09-11 VITALS — SYSTOLIC BLOOD PRESSURE: 106 MMHG | BODY MASS INDEX: 26.52 KG/M2 | DIASTOLIC BLOOD PRESSURE: 64 MMHG | WEIGHT: 145 LBS

## 2023-09-11 PROCEDURE — 90686 IIV4 VACC NO PRSV 0.5 ML IM: CPT

## 2023-09-11 PROCEDURE — 90471 IMMUNIZATION ADMIN: CPT

## 2023-09-11 PROCEDURE — 0502F SUBSEQUENT PRENATAL CARE: CPT

## 2023-09-12 ENCOUNTER — TRANSCRIPTION ENCOUNTER (OUTPATIENT)
Age: 38
End: 2023-09-12

## 2023-09-12 LAB
BILIRUB UR QL STRIP: NORMAL
FIBRONECTIN PLAS-MCNC: NEGATIVE
GLUCOSE UR-MCNC: NORMAL
HCG UR QL: 0.2 EU/DL
HGB UR QL STRIP.AUTO: NORMAL
KETONES UR-MCNC: NORMAL
LEUKOCYTE ESTERASE UR QL STRIP: NORMAL
NITRITE UR QL STRIP: NORMAL
PH UR STRIP: 6.5
PROT UR STRIP-MCNC: NORMAL
SP GR UR STRIP: 1.01

## 2023-09-18 ENCOUNTER — OUTPATIENT (OUTPATIENT)
Dept: INPATIENT UNIT | Facility: HOSPITAL | Age: 38
LOS: 1 days | Discharge: HOME | End: 2023-09-18
Payer: COMMERCIAL

## 2023-09-18 ENCOUNTER — NON-APPOINTMENT (OUTPATIENT)
Age: 38
End: 2023-09-18

## 2023-09-18 ENCOUNTER — RESULT REVIEW (OUTPATIENT)
Age: 38
End: 2023-09-18

## 2023-09-18 ENCOUNTER — APPOINTMENT (OUTPATIENT)
Dept: OBGYN | Facility: CLINIC | Age: 38
End: 2023-09-18
Payer: COMMERCIAL

## 2023-09-18 VITALS
SYSTOLIC BLOOD PRESSURE: 116 MMHG | HEIGHT: 62 IN | BODY MASS INDEX: 26.87 KG/M2 | WEIGHT: 146 LBS | DIASTOLIC BLOOD PRESSURE: 70 MMHG

## 2023-09-18 DIAGNOSIS — O26.899 OTHER SPECIFIED PREGNANCY RELATED CONDITIONS, UNSPECIFIED TRIMESTER: ICD-10-CM

## 2023-09-18 DIAGNOSIS — Z3A.00 WEEKS OF GESTATION OF PREGNANCY NOT SPECIFIED: ICD-10-CM

## 2023-09-18 LAB
AMNISURE ROM (RUPTURE OF MEMBRANES): NEGATIVE — SIGNIFICANT CHANGE UP
APPEARANCE UR: CLEAR — SIGNIFICANT CHANGE UP
BILIRUB UR-MCNC: NEGATIVE — SIGNIFICANT CHANGE UP
COLOR SPEC: YELLOW — SIGNIFICANT CHANGE UP
DIFF PNL FLD: NEGATIVE — SIGNIFICANT CHANGE UP
GLUCOSE UR QL: NEGATIVE — SIGNIFICANT CHANGE UP
KETONES UR-MCNC: ABNORMAL
LEUKOCYTE ESTERASE UR-ACNC: NEGATIVE — SIGNIFICANT CHANGE UP
NITRITE UR-MCNC: NEGATIVE — SIGNIFICANT CHANGE UP
PH UR: 5 — SIGNIFICANT CHANGE UP (ref 5–8)
PROT UR-MCNC: NEGATIVE — SIGNIFICANT CHANGE UP
SP GR SPEC: 1.02 — SIGNIFICANT CHANGE UP (ref 1.01–1.02)
UROBILINOGEN FLD QL: NEGATIVE — SIGNIFICANT CHANGE UP

## 2023-09-18 PROCEDURE — 81003 URINALYSIS AUTO W/O SCOPE: CPT

## 2023-09-18 PROCEDURE — 87591 N.GONORRHOEAE DNA AMP PROB: CPT

## 2023-09-18 PROCEDURE — 87800 DETECT AGNT MULT DNA DIREC: CPT

## 2023-09-18 PROCEDURE — 87491 CHLMYD TRACH DNA AMP PROBE: CPT

## 2023-09-18 PROCEDURE — 76819 FETAL BIOPHYS PROFIL W/O NST: CPT | Mod: 26

## 2023-09-18 PROCEDURE — 0502F SUBSEQUENT PRENATAL CARE: CPT

## 2023-09-18 PROCEDURE — 76815 OB US LIMITED FETUS(S): CPT | Mod: 26

## 2023-09-18 PROCEDURE — 96360 HYDRATION IV INFUSION INIT: CPT

## 2023-09-18 PROCEDURE — 59025 FETAL NON-STRESS TEST: CPT

## 2023-09-18 PROCEDURE — 76817 TRANSVAGINAL US OBSTETRIC: CPT | Mod: 26

## 2023-09-18 PROCEDURE — 76815 OB US LIMITED FETUS(S): CPT

## 2023-09-18 PROCEDURE — 76819 FETAL BIOPHYS PROFIL W/O NST: CPT

## 2023-09-18 PROCEDURE — 76817 TRANSVAGINAL US OBSTETRIC: CPT

## 2023-09-18 PROCEDURE — 36415 COLL VENOUS BLD VENIPUNCTURE: CPT

## 2023-09-18 PROCEDURE — 99214 OFFICE O/P EST MOD 30 MIN: CPT

## 2023-09-18 PROCEDURE — 84112 EVAL AMNIOTIC FLUID PROTEIN: CPT

## 2023-09-18 PROCEDURE — 96372 THER/PROPH/DIAG INJ SC/IM: CPT

## 2023-09-18 RX ORDER — SODIUM CHLORIDE 9 MG/ML
1000 INJECTION, SOLUTION INTRAVENOUS ONCE
Refills: 0 | Status: COMPLETED | OUTPATIENT
Start: 2023-09-18 | End: 2023-09-18

## 2023-09-18 RX ADMIN — Medication 12 MILLIGRAM(S): at 20:42

## 2023-09-18 RX ADMIN — SODIUM CHLORIDE 1000 MILLILITER(S): 9 INJECTION, SOLUTION INTRAVENOUS at 21:24

## 2023-09-19 ENCOUNTER — OUTPATIENT (OUTPATIENT)
Dept: INPATIENT UNIT | Facility: HOSPITAL | Age: 38
LOS: 1 days | Discharge: HOME | End: 2023-09-19
Payer: COMMERCIAL

## 2023-09-19 DIAGNOSIS — Z3A.00 WEEKS OF GESTATION OF PREGNANCY NOT SPECIFIED: ICD-10-CM

## 2023-09-19 DIAGNOSIS — O26.899 OTHER SPECIFIED PREGNANCY RELATED CONDITIONS, UNSPECIFIED TRIMESTER: ICD-10-CM

## 2023-09-19 LAB
C TRACH RRNA SPEC QL NAA+PROBE: SIGNIFICANT CHANGE UP
CANDIDA AB TITR SER: SIGNIFICANT CHANGE UP
G VAGINALIS DNA SPEC QL NAA+PROBE: SIGNIFICANT CHANGE UP
N GONORRHOEA RRNA SPEC QL NAA+PROBE: SIGNIFICANT CHANGE UP
SPECIMEN SOURCE: SIGNIFICANT CHANGE UP
T VAGINALIS SPEC QL WET PREP: SIGNIFICANT CHANGE UP

## 2023-09-19 PROCEDURE — 96372 THER/PROPH/DIAG INJ SC/IM: CPT

## 2023-09-19 PROCEDURE — 59025 FETAL NON-STRESS TEST: CPT

## 2023-09-19 PROCEDURE — 99214 OFFICE O/P EST MOD 30 MIN: CPT

## 2023-09-19 RX ADMIN — Medication 12 MILLIGRAM(S): at 21:28

## 2023-09-20 DIAGNOSIS — Z3A.00 WEEKS OF GESTATION OF PREGNANCY NOT SPECIFIED: ICD-10-CM

## 2023-09-20 DIAGNOSIS — O26.899 OTHER SPECIFIED PREGNANCY RELATED CONDITIONS, UNSPECIFIED TRIMESTER: ICD-10-CM

## 2023-09-21 DIAGNOSIS — O26.899 OTHER SPECIFIED PREGNANCY RELATED CONDITIONS, UNSPECIFIED TRIMESTER: ICD-10-CM

## 2023-09-21 DIAGNOSIS — Z3A.00 WEEKS OF GESTATION OF PREGNANCY NOT SPECIFIED: ICD-10-CM

## 2023-09-22 ENCOUNTER — NON-APPOINTMENT (OUTPATIENT)
Age: 38
End: 2023-09-22

## 2023-09-25 ENCOUNTER — APPOINTMENT (OUTPATIENT)
Dept: OBGYN | Facility: CLINIC | Age: 38
End: 2023-09-25
Payer: COMMERCIAL

## 2023-09-25 VITALS — BODY MASS INDEX: 26.7 KG/M2 | SYSTOLIC BLOOD PRESSURE: 104 MMHG | DIASTOLIC BLOOD PRESSURE: 68 MMHG | WEIGHT: 146 LBS

## 2023-09-25 PROCEDURE — 81003 URINALYSIS AUTO W/O SCOPE: CPT | Mod: NC,QW

## 2023-09-25 PROCEDURE — 0502F SUBSEQUENT PRENATAL CARE: CPT

## 2023-09-29 ENCOUNTER — NON-APPOINTMENT (OUTPATIENT)
Age: 38
End: 2023-09-29

## 2023-10-02 ENCOUNTER — APPOINTMENT (OUTPATIENT)
Dept: OBGYN | Facility: CLINIC | Age: 38
End: 2023-10-02
Payer: COMMERCIAL

## 2023-10-02 VITALS — SYSTOLIC BLOOD PRESSURE: 118 MMHG | WEIGHT: 146 LBS | DIASTOLIC BLOOD PRESSURE: 67 MMHG | BODY MASS INDEX: 26.7 KG/M2

## 2023-10-02 DIAGNOSIS — Z34.93 ENCOUNTER FOR SUPERVISION OF NORMAL PREGNANCY, UNSPECIFIED, THIRD TRIMESTER: ICD-10-CM

## 2023-10-02 PROCEDURE — 0502F SUBSEQUENT PRENATAL CARE: CPT

## 2023-10-05 ENCOUNTER — APPOINTMENT (OUTPATIENT)
Dept: ANTEPARTUM | Facility: CLINIC | Age: 38
End: 2023-10-05
Payer: COMMERCIAL

## 2023-10-05 LAB
GP B STREP DNA SPEC QL NAA+PROBE: NOT DETECTED
HCT VFR BLD CALC: 36.4 %
HGB BLD-MCNC: 12 G/DL
HIV1+2 AB SPEC QL IA.RAPID: NONREACTIVE
MCHC RBC-ENTMCNC: 30.8 PG
MCHC RBC-ENTMCNC: 33 GM/DL
MCV RBC AUTO: 93.3 FL
PLATELET # BLD AUTO: 253 K/UL
RBC # BLD: 3.9 M/UL
RBC # FLD: 12.7 %
SOURCE GBS: NORMAL
WBC # FLD AUTO: 7 K/UL

## 2023-10-09 ENCOUNTER — APPOINTMENT (OUTPATIENT)
Dept: OBGYN | Facility: CLINIC | Age: 38
End: 2023-10-09
Payer: COMMERCIAL

## 2023-10-09 VITALS
SYSTOLIC BLOOD PRESSURE: 96 MMHG | DIASTOLIC BLOOD PRESSURE: 58 MMHG | WEIGHT: 148 LBS | HEIGHT: 62 IN | BODY MASS INDEX: 27.23 KG/M2

## 2023-10-09 LAB
BILIRUB UR QL STRIP: NORMAL
CLARITY UR: CLEAR
COLLECTION METHOD: NORMAL
GLUCOSE UR-MCNC: NORMAL
HCG UR QL: 0.2 EU/DL
HGB UR QL STRIP.AUTO: NORMAL
KETONES UR-MCNC: NORMAL
LEUKOCYTE ESTERASE UR QL STRIP: NORMAL
NITRITE UR QL STRIP: NORMAL
PH UR STRIP: 6
PROT UR STRIP-MCNC: NORMAL
SP GR UR STRIP: 1

## 2023-10-09 PROCEDURE — 81003 URINALYSIS AUTO W/O SCOPE: CPT | Mod: NC,QW

## 2023-10-09 PROCEDURE — 0502F SUBSEQUENT PRENATAL CARE: CPT

## 2023-10-10 ENCOUNTER — APPOINTMENT (OUTPATIENT)
Dept: ANTEPARTUM | Facility: CLINIC | Age: 38
End: 2023-10-10
Payer: COMMERCIAL

## 2023-10-10 ENCOUNTER — APPOINTMENT (OUTPATIENT)
Dept: MATERNAL FETAL MEDICINE | Facility: CLINIC | Age: 38
End: 2023-10-10
Payer: COMMERCIAL

## 2023-10-10 ENCOUNTER — ASOB RESULT (OUTPATIENT)
Age: 38
End: 2023-10-10

## 2023-10-10 ENCOUNTER — NON-APPOINTMENT (OUTPATIENT)
Age: 38
End: 2023-10-10

## 2023-10-10 PROCEDURE — 76816 OB US FOLLOW-UP PER FETUS: CPT

## 2023-10-10 PROCEDURE — 99213 OFFICE O/P EST LOW 20 MIN: CPT | Mod: 25

## 2023-10-10 PROCEDURE — 76819 FETAL BIOPHYS PROFIL W/O NST: CPT

## 2023-10-10 PROCEDURE — ZZZZZ: CPT

## 2023-10-11 ENCOUNTER — APPOINTMENT (OUTPATIENT)
Dept: PEDIATRIC UROLOGY | Facility: CLINIC | Age: 38
End: 2023-10-11
Payer: COMMERCIAL

## 2023-10-11 DIAGNOSIS — Q62.0 CONGENITAL HYDRONEPHROSIS: ICD-10-CM

## 2023-10-11 DIAGNOSIS — O35.EXX0 MATERNAL CARE FOR OTHER (SUSPECTED) FETAL ABNORMALITY AND DAMAGE, FETAL GENITOURINARY ANOMALIES, NOT APPLICABLE OR UNSPECIFIED: ICD-10-CM

## 2023-10-11 PROCEDURE — 99204 OFFICE O/P NEW MOD 45 MIN: CPT | Mod: 95

## 2023-10-12 ENCOUNTER — NON-APPOINTMENT (OUTPATIENT)
Age: 38
End: 2023-10-12

## 2023-10-13 ENCOUNTER — TRANSCRIPTION ENCOUNTER (OUTPATIENT)
Age: 38
End: 2023-10-13

## 2023-10-14 PROBLEM — Q62.0 CONGENITAL HYDRONEPHROSIS: Status: ACTIVE | Noted: 2023-10-14

## 2023-10-16 ENCOUNTER — NON-APPOINTMENT (OUTPATIENT)
Age: 38
End: 2023-10-16

## 2023-10-16 ENCOUNTER — APPOINTMENT (OUTPATIENT)
Dept: OBGYN | Facility: CLINIC | Age: 38
End: 2023-10-16
Payer: COMMERCIAL

## 2023-10-16 VITALS
DIASTOLIC BLOOD PRESSURE: 62 MMHG | SYSTOLIC BLOOD PRESSURE: 106 MMHG | HEIGHT: 62 IN | BODY MASS INDEX: 27.23 KG/M2 | WEIGHT: 148 LBS

## 2023-10-16 LAB
BILIRUB UR QL STRIP: NORMAL
GLUCOSE UR-MCNC: NORMAL
HCG UR QL: 0.2 EU/DL
HGB UR QL STRIP.AUTO: NORMAL
KETONES UR-MCNC: NORMAL
LEUKOCYTE ESTERASE UR QL STRIP: NORMAL
NITRITE UR QL STRIP: NORMAL
PH UR STRIP: 6
PROT UR STRIP-MCNC: NORMAL
SP GR UR STRIP: 1

## 2023-10-16 PROCEDURE — 0502F SUBSEQUENT PRENATAL CARE: CPT

## 2023-10-17 ENCOUNTER — NON-APPOINTMENT (OUTPATIENT)
Age: 38
End: 2023-10-17

## 2023-10-17 ENCOUNTER — APPOINTMENT (OUTPATIENT)
Dept: ANTEPARTUM | Facility: CLINIC | Age: 38
End: 2023-10-17

## 2023-10-18 ENCOUNTER — ASOB RESULT (OUTPATIENT)
Age: 38
End: 2023-10-18

## 2023-10-18 ENCOUNTER — APPOINTMENT (OUTPATIENT)
Dept: ANTEPARTUM | Facility: CLINIC | Age: 38
End: 2023-10-18
Payer: COMMERCIAL

## 2023-10-18 PROCEDURE — 76819 FETAL BIOPHYS PROFIL W/O NST: CPT

## 2023-10-23 ENCOUNTER — NON-APPOINTMENT (OUTPATIENT)
Age: 38
End: 2023-10-23

## 2023-10-23 ENCOUNTER — APPOINTMENT (OUTPATIENT)
Dept: OBGYN | Facility: CLINIC | Age: 38
End: 2023-10-23
Payer: COMMERCIAL

## 2023-10-23 VITALS
BODY MASS INDEX: 27.6 KG/M2 | HEIGHT: 62 IN | DIASTOLIC BLOOD PRESSURE: 64 MMHG | WEIGHT: 150 LBS | SYSTOLIC BLOOD PRESSURE: 102 MMHG

## 2023-10-23 PROCEDURE — 0502F SUBSEQUENT PRENATAL CARE: CPT

## 2023-10-24 ENCOUNTER — APPOINTMENT (OUTPATIENT)
Dept: ANTEPARTUM | Facility: CLINIC | Age: 38
End: 2023-10-24

## 2023-10-24 LAB
BILIRUB UR QL STRIP: NORMAL
GLUCOSE UR-MCNC: NORMAL
HCG UR QL: 0.2 EU/DL
HGB UR QL STRIP.AUTO: NORMAL
KETONES UR-MCNC: NORMAL
LEUKOCYTE ESTERASE UR QL STRIP: NORMAL
NITRITE UR QL STRIP: NORMAL
PH UR STRIP: 6
PROT UR STRIP-MCNC: NORMAL
SP GR UR STRIP: 1.01

## 2023-10-25 ENCOUNTER — APPOINTMENT (OUTPATIENT)
Dept: ANTEPARTUM | Facility: CLINIC | Age: 38
End: 2023-10-25
Payer: COMMERCIAL

## 2023-10-25 ENCOUNTER — ASOB RESULT (OUTPATIENT)
Age: 38
End: 2023-10-25

## 2023-10-25 PROCEDURE — 76819 FETAL BIOPHYS PROFIL W/O NST: CPT

## 2023-10-25 PROCEDURE — 76820 UMBILICAL ARTERY ECHO: CPT

## 2023-10-27 ENCOUNTER — NON-APPOINTMENT (OUTPATIENT)
Age: 38
End: 2023-10-27

## 2023-10-30 ENCOUNTER — NON-APPOINTMENT (OUTPATIENT)
Age: 38
End: 2023-10-30

## 2023-10-30 ENCOUNTER — APPOINTMENT (OUTPATIENT)
Dept: OBGYN | Facility: CLINIC | Age: 38
End: 2023-10-30
Payer: COMMERCIAL

## 2023-10-30 VITALS
SYSTOLIC BLOOD PRESSURE: 92 MMHG | HEIGHT: 62 IN | WEIGHT: 151 LBS | DIASTOLIC BLOOD PRESSURE: 60 MMHG | BODY MASS INDEX: 27.79 KG/M2

## 2023-10-30 LAB
BILIRUB UR QL STRIP: NORMAL
CLARITY UR: CLEAR
COLLECTION METHOD: NORMAL
GLUCOSE UR-MCNC: NORMAL
HCG UR QL: 0.2 EU/DL
HGB UR QL STRIP.AUTO: NORMAL
KETONES UR-MCNC: NORMAL
LEUKOCYTE ESTERASE UR QL STRIP: NORMAL
NITRITE UR QL STRIP: NORMAL
PH UR STRIP: 6
PROT UR STRIP-MCNC: NORMAL
SP GR UR STRIP: 1.01

## 2023-10-30 PROCEDURE — 0502F SUBSEQUENT PRENATAL CARE: CPT

## 2023-10-31 ENCOUNTER — NON-APPOINTMENT (OUTPATIENT)
Age: 38
End: 2023-10-31

## 2023-10-31 ENCOUNTER — APPOINTMENT (OUTPATIENT)
Dept: ANTEPARTUM | Facility: CLINIC | Age: 38
End: 2023-10-31

## 2023-11-01 ENCOUNTER — ASOB RESULT (OUTPATIENT)
Age: 38
End: 2023-11-01

## 2023-11-01 ENCOUNTER — APPOINTMENT (OUTPATIENT)
Dept: ANTEPARTUM | Facility: CLINIC | Age: 38
End: 2023-11-01
Payer: COMMERCIAL

## 2023-11-01 ENCOUNTER — APPOINTMENT (OUTPATIENT)
Dept: PEDIATRIC UROLOGY | Facility: CLINIC | Age: 38
End: 2023-11-01
Payer: COMMERCIAL

## 2023-11-01 PROCEDURE — 76819 FETAL BIOPHYS PROFIL W/O NST: CPT

## 2023-11-01 PROCEDURE — 99214 OFFICE O/P EST MOD 30 MIN: CPT | Mod: 95

## 2023-11-03 ENCOUNTER — INPATIENT (INPATIENT)
Facility: HOSPITAL | Age: 38
LOS: 1 days | Discharge: HOME | End: 2023-11-05
Attending: OBSTETRICS & GYNECOLOGY | Admitting: OBSTETRICS & GYNECOLOGY
Payer: COMMERCIAL

## 2023-11-03 ENCOUNTER — APPOINTMENT (OUTPATIENT)
Dept: ANTEPARTUM | Facility: CLINIC | Age: 38
End: 2023-11-03

## 2023-11-03 ENCOUNTER — NON-APPOINTMENT (OUTPATIENT)
Age: 38
End: 2023-11-03

## 2023-11-03 VITALS — WEIGHT: 151.02 LBS | HEIGHT: 62 IN

## 2023-11-03 DIAGNOSIS — O26.899 OTHER SPECIFIED PREGNANCY RELATED CONDITIONS, UNSPECIFIED TRIMESTER: ICD-10-CM

## 2023-11-03 DIAGNOSIS — Z3A.00 WEEKS OF GESTATION OF PREGNANCY NOT SPECIFIED: ICD-10-CM

## 2023-11-03 LAB
AMNISURE ROM (RUPTURE OF MEMBRANES): POSITIVE
AMNISURE ROM (RUPTURE OF MEMBRANES): POSITIVE
BASOPHILS # BLD AUTO: 0.02 K/UL — SIGNIFICANT CHANGE UP (ref 0–0.2)
BASOPHILS # BLD AUTO: 0.02 K/UL — SIGNIFICANT CHANGE UP (ref 0–0.2)
BASOPHILS NFR BLD AUTO: 0.2 % — SIGNIFICANT CHANGE UP (ref 0–2)
BASOPHILS NFR BLD AUTO: 0.2 % — SIGNIFICANT CHANGE UP (ref 0–2)
EOSINOPHIL # BLD AUTO: 0.04 K/UL — SIGNIFICANT CHANGE UP (ref 0–0.5)
EOSINOPHIL # BLD AUTO: 0.04 K/UL — SIGNIFICANT CHANGE UP (ref 0–0.5)
EOSINOPHIL NFR BLD AUTO: 0.5 % — SIGNIFICANT CHANGE UP (ref 0–6)
EOSINOPHIL NFR BLD AUTO: 0.5 % — SIGNIFICANT CHANGE UP (ref 0–6)
HCT VFR BLD CALC: 35.4 % — SIGNIFICANT CHANGE UP (ref 34.5–45)
HCT VFR BLD CALC: 35.4 % — SIGNIFICANT CHANGE UP (ref 34.5–45)
HGB BLD-MCNC: 12.4 G/DL — SIGNIFICANT CHANGE UP (ref 11.5–15.5)
HGB BLD-MCNC: 12.4 G/DL — SIGNIFICANT CHANGE UP (ref 11.5–15.5)
IMM GRANULOCYTES NFR BLD AUTO: 0.5 % — SIGNIFICANT CHANGE UP (ref 0–0.9)
IMM GRANULOCYTES NFR BLD AUTO: 0.5 % — SIGNIFICANT CHANGE UP (ref 0–0.9)
LYMPHOCYTES # BLD AUTO: 1.18 K/UL — SIGNIFICANT CHANGE UP (ref 1–3.3)
LYMPHOCYTES # BLD AUTO: 1.18 K/UL — SIGNIFICANT CHANGE UP (ref 1–3.3)
LYMPHOCYTES # BLD AUTO: 13.7 % — SIGNIFICANT CHANGE UP (ref 13–44)
LYMPHOCYTES # BLD AUTO: 13.7 % — SIGNIFICANT CHANGE UP (ref 13–44)
MCHC RBC-ENTMCNC: 30.8 PG — SIGNIFICANT CHANGE UP (ref 27–34)
MCHC RBC-ENTMCNC: 30.8 PG — SIGNIFICANT CHANGE UP (ref 27–34)
MCHC RBC-ENTMCNC: 35 GM/DL — SIGNIFICANT CHANGE UP (ref 32–36)
MCHC RBC-ENTMCNC: 35 GM/DL — SIGNIFICANT CHANGE UP (ref 32–36)
MCV RBC AUTO: 88.1 FL — SIGNIFICANT CHANGE UP (ref 80–100)
MCV RBC AUTO: 88.1 FL — SIGNIFICANT CHANGE UP (ref 80–100)
MONOCYTES # BLD AUTO: 0.62 K/UL — SIGNIFICANT CHANGE UP (ref 0–0.9)
MONOCYTES # BLD AUTO: 0.62 K/UL — SIGNIFICANT CHANGE UP (ref 0–0.9)
MONOCYTES NFR BLD AUTO: 7.2 % — SIGNIFICANT CHANGE UP (ref 2–14)
MONOCYTES NFR BLD AUTO: 7.2 % — SIGNIFICANT CHANGE UP (ref 2–14)
NEUTROPHILS # BLD AUTO: 6.7 K/UL — SIGNIFICANT CHANGE UP (ref 1.8–7.4)
NEUTROPHILS # BLD AUTO: 6.7 K/UL — SIGNIFICANT CHANGE UP (ref 1.8–7.4)
NEUTROPHILS NFR BLD AUTO: 77.9 % — HIGH (ref 43–77)
NEUTROPHILS NFR BLD AUTO: 77.9 % — HIGH (ref 43–77)
PLATELET # BLD AUTO: 214 K/UL — SIGNIFICANT CHANGE UP (ref 150–400)
PLATELET # BLD AUTO: 214 K/UL — SIGNIFICANT CHANGE UP (ref 150–400)
RBC # BLD: 4.02 M/UL — SIGNIFICANT CHANGE UP (ref 3.8–5.2)
RBC # BLD: 4.02 M/UL — SIGNIFICANT CHANGE UP (ref 3.8–5.2)
RBC # FLD: 12.6 % — SIGNIFICANT CHANGE UP (ref 10.3–14.5)
RBC # FLD: 12.6 % — SIGNIFICANT CHANGE UP (ref 10.3–14.5)
T PALLIDUM AB TITR SER: NEGATIVE — SIGNIFICANT CHANGE UP
T PALLIDUM AB TITR SER: NEGATIVE — SIGNIFICANT CHANGE UP
WBC # BLD: 8.6 K/UL — SIGNIFICANT CHANGE UP (ref 3.8–10.5)
WBC # BLD: 8.6 K/UL — SIGNIFICANT CHANGE UP (ref 3.8–10.5)
WBC # FLD AUTO: 8.6 K/UL — SIGNIFICANT CHANGE UP (ref 3.8–10.5)
WBC # FLD AUTO: 8.6 K/UL — SIGNIFICANT CHANGE UP (ref 3.8–10.5)

## 2023-11-03 PROCEDURE — 36415 COLL VENOUS BLD VENIPUNCTURE: CPT

## 2023-11-03 PROCEDURE — 94760 N-INVAS EAR/PLS OXIMETRY 1: CPT

## 2023-11-03 PROCEDURE — C1889: CPT

## 2023-11-03 PROCEDURE — 59050 FETAL MONITOR W/REPORT: CPT

## 2023-11-03 PROCEDURE — 99214 OFFICE O/P EST MOD 30 MIN: CPT

## 2023-11-03 PROCEDURE — 85014 HEMATOCRIT: CPT

## 2023-11-03 PROCEDURE — 86850 RBC ANTIBODY SCREEN: CPT

## 2023-11-03 PROCEDURE — 86901 BLOOD TYPING SEROLOGIC RH(D): CPT

## 2023-11-03 PROCEDURE — 85018 HEMOGLOBIN: CPT

## 2023-11-03 PROCEDURE — 59400 OBSTETRICAL CARE: CPT

## 2023-11-03 PROCEDURE — 85025 COMPLETE CBC W/AUTO DIFF WBC: CPT

## 2023-11-03 PROCEDURE — 86780 TREPONEMA PALLIDUM: CPT

## 2023-11-03 PROCEDURE — 86900 BLOOD TYPING SEROLOGIC ABO: CPT

## 2023-11-03 RX ORDER — ACETAMINOPHEN 500 MG
975 TABLET ORAL
Refills: 0 | Status: DISCONTINUED | OUTPATIENT
Start: 2023-11-03 | End: 2023-11-05

## 2023-11-03 RX ORDER — CHLORHEXIDINE GLUCONATE 213 G/1000ML
1 SOLUTION TOPICAL DAILY
Refills: 0 | Status: DISCONTINUED | OUTPATIENT
Start: 2023-11-03 | End: 2023-11-03

## 2023-11-03 RX ORDER — OXYTOCIN 10 UNIT/ML
41.67 VIAL (ML) INJECTION
Qty: 20 | Refills: 0 | Status: DISCONTINUED | OUTPATIENT
Start: 2023-11-03 | End: 2023-11-05

## 2023-11-03 RX ORDER — MESALAMINE 400 MG
2.4 TABLET, DELAYED RELEASE (ENTERIC COATED) ORAL
Refills: 0 | Status: DISCONTINUED | OUTPATIENT
Start: 2023-11-03 | End: 2023-11-05

## 2023-11-03 RX ORDER — DIBUCAINE 1 %
1 OINTMENT (GRAM) RECTAL EVERY 6 HOURS
Refills: 0 | Status: DISCONTINUED | OUTPATIENT
Start: 2023-11-03 | End: 2023-11-05

## 2023-11-03 RX ORDER — TETANUS TOXOID, REDUCED DIPHTHERIA TOXOID AND ACELLULAR PERTUSSIS VACCINE, ADSORBED 5; 2.5; 8; 8; 2.5 [IU]/.5ML; [IU]/.5ML; UG/.5ML; UG/.5ML; UG/.5ML
0.5 SUSPENSION INTRAMUSCULAR ONCE
Refills: 0 | Status: DISCONTINUED | OUTPATIENT
Start: 2023-11-03 | End: 2023-11-05

## 2023-11-03 RX ORDER — HYDROCORTISONE 1 %
1 OINTMENT (GRAM) TOPICAL EVERY 6 HOURS
Refills: 0 | Status: DISCONTINUED | OUTPATIENT
Start: 2023-11-03 | End: 2023-11-05

## 2023-11-03 RX ORDER — BENZOCAINE 10 %
1 GEL (GRAM) MUCOUS MEMBRANE EVERY 6 HOURS
Refills: 0 | Status: DISCONTINUED | OUTPATIENT
Start: 2023-11-03 | End: 2023-11-05

## 2023-11-03 RX ORDER — LANOLIN
1 OINTMENT (GRAM) TOPICAL EVERY 6 HOURS
Refills: 0 | Status: DISCONTINUED | OUTPATIENT
Start: 2023-11-03 | End: 2023-11-05

## 2023-11-03 RX ORDER — SODIUM CHLORIDE 9 MG/ML
1000 INJECTION, SOLUTION INTRAVENOUS
Refills: 0 | Status: DISCONTINUED | OUTPATIENT
Start: 2023-11-03 | End: 2023-11-03

## 2023-11-03 RX ORDER — MESALAMINE 400 MG
1 TABLET, DELAYED RELEASE (ENTERIC COATED) ORAL
Refills: 0 | DISCHARGE

## 2023-11-03 RX ORDER — OXYCODONE HYDROCHLORIDE 5 MG/1
5 TABLET ORAL
Refills: 0 | Status: DISCONTINUED | OUTPATIENT
Start: 2023-11-03 | End: 2023-11-05

## 2023-11-03 RX ORDER — OXYTOCIN 10 UNIT/ML
333.33 VIAL (ML) INJECTION
Qty: 20 | Refills: 0 | Status: COMPLETED | OUTPATIENT
Start: 2023-11-03 | End: 2023-11-03

## 2023-11-03 RX ORDER — CITRIC ACID/SODIUM CITRATE 300-500 MG
30 SOLUTION, ORAL ORAL ONCE
Refills: 0 | Status: COMPLETED | OUTPATIENT
Start: 2023-11-03 | End: 2023-11-03

## 2023-11-03 RX ORDER — MAGNESIUM HYDROXIDE 400 MG/1
30 TABLET, CHEWABLE ORAL
Refills: 0 | Status: DISCONTINUED | OUTPATIENT
Start: 2023-11-03 | End: 2023-11-05

## 2023-11-03 RX ORDER — OXYTOCIN 10 UNIT/ML
2 VIAL (ML) INJECTION
Qty: 30 | Refills: 0 | Status: DISCONTINUED | OUTPATIENT
Start: 2023-11-03 | End: 2023-11-05

## 2023-11-03 RX ORDER — SODIUM CHLORIDE 9 MG/ML
3 INJECTION INTRAMUSCULAR; INTRAVENOUS; SUBCUTANEOUS EVERY 8 HOURS
Refills: 0 | Status: DISCONTINUED | OUTPATIENT
Start: 2023-11-03 | End: 2023-11-04

## 2023-11-03 RX ORDER — IBUPROFEN 200 MG
600 TABLET ORAL EVERY 6 HOURS
Refills: 0 | Status: DISCONTINUED | OUTPATIENT
Start: 2023-11-03 | End: 2023-11-03

## 2023-11-03 RX ORDER — SIMETHICONE 80 MG/1
80 TABLET, CHEWABLE ORAL EVERY 4 HOURS
Refills: 0 | Status: DISCONTINUED | OUTPATIENT
Start: 2023-11-03 | End: 2023-11-05

## 2023-11-03 RX ORDER — AER TRAVELER 0.5 G/1
1 SOLUTION RECTAL; TOPICAL EVERY 4 HOURS
Refills: 0 | Status: DISCONTINUED | OUTPATIENT
Start: 2023-11-03 | End: 2023-11-05

## 2023-11-03 RX ORDER — ACETAMINOPHEN 500 MG
1000 TABLET ORAL ONCE
Refills: 0 | Status: COMPLETED | OUTPATIENT
Start: 2023-11-03 | End: 2023-11-03

## 2023-11-03 RX ORDER — KETOROLAC TROMETHAMINE 30 MG/ML
30 SYRINGE (ML) INJECTION ONCE
Refills: 0 | Status: DISCONTINUED | OUTPATIENT
Start: 2023-11-03 | End: 2023-11-03

## 2023-11-03 RX ORDER — OXYCODONE HYDROCHLORIDE 5 MG/1
5 TABLET ORAL ONCE
Refills: 0 | Status: DISCONTINUED | OUTPATIENT
Start: 2023-11-03 | End: 2023-11-05

## 2023-11-03 RX ORDER — DIPHENHYDRAMINE HCL 50 MG
25 CAPSULE ORAL EVERY 6 HOURS
Refills: 0 | Status: DISCONTINUED | OUTPATIENT
Start: 2023-11-03 | End: 2023-11-05

## 2023-11-03 RX ORDER — PRAMOXINE HYDROCHLORIDE 150 MG/15G
1 AEROSOL, FOAM RECTAL EVERY 4 HOURS
Refills: 0 | Status: DISCONTINUED | OUTPATIENT
Start: 2023-11-03 | End: 2023-11-05

## 2023-11-03 RX ADMIN — SODIUM CHLORIDE 3 MILLILITER(S): 9 INJECTION INTRAMUSCULAR; INTRAVENOUS; SUBCUTANEOUS at 22:31

## 2023-11-03 RX ADMIN — Medication 400 MILLIGRAM(S): at 18:52

## 2023-11-03 RX ADMIN — Medication 1000 MILLIGRAM(S): at 19:15

## 2023-11-03 RX ADMIN — Medication 1000 MILLIUNIT(S)/MIN: at 18:05

## 2023-11-03 RX ADMIN — SODIUM CHLORIDE 125 MILLILITER(S): 9 INJECTION, SOLUTION INTRAVENOUS at 12:35

## 2023-11-03 RX ADMIN — SODIUM CHLORIDE 125 MILLILITER(S): 9 INJECTION, SOLUTION INTRAVENOUS at 16:46

## 2023-11-03 RX ADMIN — SODIUM CHLORIDE 125 MILLILITER(S): 9 INJECTION, SOLUTION INTRAVENOUS at 11:52

## 2023-11-03 RX ADMIN — Medication 30 MILLILITER(S): at 11:53

## 2023-11-04 LAB
HCT VFR BLD CALC: 33.8 % — LOW (ref 34.5–45)
HCT VFR BLD CALC: 33.8 % — LOW (ref 34.5–45)
HGB BLD-MCNC: 11.6 G/DL — SIGNIFICANT CHANGE UP (ref 11.5–15.5)
HGB BLD-MCNC: 11.6 G/DL — SIGNIFICANT CHANGE UP (ref 11.5–15.5)

## 2023-11-04 RX ADMIN — Medication 975 MILLIGRAM(S): at 12:07

## 2023-11-04 RX ADMIN — Medication 975 MILLIGRAM(S): at 18:17

## 2023-11-04 RX ADMIN — SODIUM CHLORIDE 3 MILLILITER(S): 9 INJECTION INTRAMUSCULAR; INTRAVENOUS; SUBCUTANEOUS at 06:16

## 2023-11-04 RX ADMIN — Medication 975 MILLIGRAM(S): at 12:18

## 2023-11-04 RX ADMIN — Medication 1 APPLICATION(S): at 00:32

## 2023-11-04 RX ADMIN — Medication 2.4 GRAM(S): at 22:00

## 2023-11-04 RX ADMIN — Medication 975 MILLIGRAM(S): at 01:30

## 2023-11-04 RX ADMIN — Medication 2.4 GRAM(S): at 09:17

## 2023-11-04 RX ADMIN — Medication 975 MILLIGRAM(S): at 06:15

## 2023-11-04 RX ADMIN — AER TRAVELER 1 APPLICATION(S): 0.5 SOLUTION RECTAL; TOPICAL at 00:32

## 2023-11-04 RX ADMIN — Medication 975 MILLIGRAM(S): at 00:30

## 2023-11-04 RX ADMIN — Medication 1 SPRAY(S): at 00:31

## 2023-11-04 RX ADMIN — Medication 975 MILLIGRAM(S): at 07:15

## 2023-11-04 RX ADMIN — Medication 1 TABLET(S): at 09:17

## 2023-11-04 RX ADMIN — Medication 975 MILLIGRAM(S): at 18:31

## 2023-11-04 NOTE — PROGRESS NOTE ADULT - ASSESSMENT
A/P:   38 y P1 now PPD#1 s/p spontaneous vaginal delivery, uncomplicated.  -Vital signs stable  -Voiding, tolerating PO, bowel function nml   -Advance care as tolerated   -Continue routine postpartum care and education  - DVT ppx: Ambulation encouraged. SCDs while in bed.   -Healthy male infant, wishes circumcision to be done   -Dispo: Patient to be discharged tomorrow when meeting all postpartum milestones and pending attending approval.

## 2023-11-05 ENCOUNTER — TRANSCRIPTION ENCOUNTER (OUTPATIENT)
Age: 38
End: 2023-11-05

## 2023-11-05 VITALS
TEMPERATURE: 98 F | HEART RATE: 84 BPM | OXYGEN SATURATION: 98 % | RESPIRATION RATE: 16 BRPM | SYSTOLIC BLOOD PRESSURE: 104 MMHG | DIASTOLIC BLOOD PRESSURE: 64 MMHG

## 2023-11-05 RX ORDER — ACETAMINOPHEN 500 MG
3 TABLET ORAL
Qty: 126 | Refills: 0
Start: 2023-11-05 | End: 2023-11-18

## 2023-11-05 RX ORDER — IBUPROFEN 200 MG
1 TABLET ORAL
Qty: 56 | Refills: 0
Start: 2023-11-05 | End: 2023-11-18

## 2023-11-05 RX ADMIN — Medication 2.4 GRAM(S): at 12:03

## 2023-11-05 RX ADMIN — Medication 975 MILLIGRAM(S): at 08:28

## 2023-11-05 RX ADMIN — Medication 975 MILLIGRAM(S): at 08:36

## 2023-11-05 RX ADMIN — Medication 975 MILLIGRAM(S): at 01:00

## 2023-11-05 RX ADMIN — Medication 975 MILLIGRAM(S): at 00:15

## 2023-11-05 NOTE — PROGRESS NOTE ADULT - SUBJECTIVE AND OBJECTIVE BOX
CATHIE MAIER is a 38y  now PPD#2 s/p spontaneous vaginal delivery, uncomplicated. Had low RAFAEL- A.     S:    No acute events overnight.   The patient has no complaints.  Pain controlled with current treatment regimen.   She is ambulating without difficulty and tolerating PO.   + flatus/-BM/+ voiding   She endorses appropriate lochia, which is decreasing.   She is breastfeeding without difficulty. She denies feeling engorged.   She denies fevers, chills, nausea and vomiting.   She denies lightheadedness, dizziness, palpitations, chest pain and SOB.     O:    T(C): 36.9 (23 @ 08:05), Max: 37.1 (23 @ 18:15)  HR: 70 (23 @ 08:05) (67 - 83)  BP: 97/60 (23 @ 08:05) (94/54 - 122/71)  RR: 17 (23 @ 08:05) (17 - 18)  SpO2: 98% (23 @ 08:05) (96% - 100%)    Gen: NAD, AOx3  CV: RRR, S1/S2 present  Pulm: CTABL  Abdomen:  Soft, non-tender, non-distended, +bowel sounds  Uterus:  Fundus firm below umbilicus  VE:  Expected lochia  Ext:  Bilateral lower extremities non-tender and non-edematous                          11.6   x     )-----------( x        ( 2023 07:49 )             33.8           
CATHIE MAIER is a 38y P1 now PPD#1 s/p spontaneous vaginal delivery, uncomplicated.    S:    No acute events overnight.   The patient has no complaints.  Pain controlled with current treatment regimen.   She is ambulating without difficulty and tolerating PO.   + flatus/-BM/+ voiding   She endorses appropriate lochia, which is decreasing.   She is breastfeeding without difficulty. She denies feeling engorged.   She denies fevers, chills, nausea and vomiting.   She denies lightheadedness, dizziness, palpitations, chest pain and SOB.     O:    T(C): 36.9 (11-04-23 @ 08:05), Max: 37.1 (11-03-23 @ 18:15)  HR: 70 (11-04-23 @ 08:05) (67 - 83)  BP: 97/60 (11-04-23 @ 08:05) (94/54 - 122/71)  RR: 17 (11-04-23 @ 08:05) (17 - 18)  SpO2: 98% (11-04-23 @ 08:05) (96% - 100%)    Gen: NAD, AOx3  CV: RRR, S1/S2 present  Pulm: CTAB  Abdomen:  Soft, non-tender, non-distended, +bowel sounds  Uterus:  Fundus firm below umbilicus  VE:  Expected lochia  Ext:  Bilateral lower extremities non-tender and non-edematous                          11.6   x     )-----------( x        ( 04 Nov 2023 07:49 )             33.8

## 2023-11-05 NOTE — DISCHARGE NOTE OB - PATIENT PORTAL LINK FT
You can access the FollowMyHealth Patient Portal offered by [facility] by registering at the following website: [email or web address]. By joining [facility]’s FollowMyHealth portal, you will also be able to view your health information using other applications (apps) compatible with our system.

## 2023-11-05 NOTE — PROGRESS NOTE ADULT - ASSESSMENT
A/P:   38 y  now PPD#2 s/p spontaneous vaginal delivery, uncomplicated.  -Vital signs stable  -Voiding, tolerating PO, bowel function nml   -Advance care as tolerated   -Continue routine postpartum care and education  - DVT ppx: Ambulation encouraged. SCDs while in bed.   -Healthy male infant, wishes circumcision to be done   -Dispo: pt to be discharged today pending attending approval     Case d/w Dr. Watt       A/P:   38 y  now PPD#2 s/p spontaneous vaginal delivery, uncomplicated.  -Vital signs stable  -Voiding, tolerating PO, bowel function nml   -Advance care as tolerated   -Continue routine postpartum care and education  - DVT ppx: Ambulation encouraged. SCDs while in bed.   -Healthy male infant, wishes circumcision to be done   -Dispo: pt to be discharged today pending attending approval     Case d/w Dr. Avila       A/P:   38 y  now PPD#2 s/p spontaneous vaginal delivery, uncomplicated.  -Vital signs stable  -Voiding, tolerating PO, bowel function nml   -Advance care as tolerated   -Continue routine postpartum care and education  - DVT ppx: Ambulation encouraged. SCDs while in bed.   - healthy male infant   -Dispo: pt to be discharged today pending attending approval     Case d/w Dr. Avila

## 2023-11-05 NOTE — DISCHARGE NOTE OB - CARE PROVIDER_API CALL
Courtney Winston  Obstetrics and Gynecology  [address], [address]  [place] [address]  Phone: [phone]  Fax: [phone]  Follow Up Time:

## 2023-11-05 NOTE — DISCHARGE NOTE OB - MEDICATION SUMMARY - MEDICATIONS TO TAKE
I will START or STAY ON the medications listed below when I get home from the hospital:    mesalamine 1000 mg rectal suppository  -- 1 suppository(ies) rectally every other day (at bedtime)  -- Indication: For Other pregnancy-related conditions, antepartum    mesalamine 1.2 g oral delayed release tablet  -- 1 tab(s) by mouth 4 times a day  -- Indication: For Other pregnancy-related conditions, antepartum    ibuprofen 600 mg oral tablet  -- 1 tab(s) by mouth every 6 hours  -- Indication: For  (normal spontaneous vaginal delivery)    acetaminophen 325 mg oral tablet  -- 3 tab(s) by mouth 3 times a day as needed for  severe pain  -- Indication: For  (normal spontaneous vaginal delivery)    benzocaine 20% topical spray  -- 1 spray(s) on skin every 6 hours, As needed, for Perineal discomfort  -- Indication: For Other pregnancy-related conditions, antepartum    dibucaine 1% topical ointment  -- 1 application on skin every 6 hours, As needed, Perineal discomfort  -- Indication: For Other pregnancy-related conditions, antepartum    lanolin topical ointment  -- 1 application on skin every 6 hours, As needed, nipple soreness  -- Indication: For  (normal spontaneous vaginal delivery)    Prenatal Multivitamins with Folic Acid 1 mg oral tablet  -- 1 tab(s) by mouth once a day  -- Indication: For  (normal spontaneous vaginal delivery)

## 2023-11-05 NOTE — DISCHARGE NOTE OB - NS AS DC STROKE DX YN
"Chief Complaint   Patient presents with     Hypertension       Initial /87  Pulse 69  Temp 96.9  F (36.1  C) (Tympanic)  Ht 5' 7.5\" (1.715 m)  Wt 204 lb 9.6 oz (92.8 kg)  BMI 31.57 kg/m2 Estimated body mass index is 31.57 kg/(m^2) as calculated from the following:    Height as of this encounter: 5' 7.5\" (1.715 m).    Weight as of this encounter: 204 lb 9.6 oz (92.8 kg).  Medication Reconciliation: complete    " no

## 2023-11-05 NOTE — DISCHARGE NOTE OB - HOSPITAL COURSE
Patient underwent a normal spontaneous vaginal delivery. Post-partum course was uncomplicated. Pain is well controlled with PRN medication. She has no difficulty with ambulation, voiding, or PO intake. Lab values and vital signs are within normal limits prior to discharge.

## 2023-11-05 NOTE — DISCHARGE NOTE OB - MEDICATION SUMMARY - MEDICATIONS TO CHANGE
I will SWITCH the dose or number of times a day I take the medications listed below when I get home from the hospital:    ibuprofen 600 mg oral tablet  -- 1 tab(s) by mouth every 6 hours    acetaminophen 325 mg oral tablet  -- 3 tab(s) by mouth 3 times a day as needed for  severe pain

## 2023-11-05 NOTE — PROGRESS NOTE ADULT - ATTENDING COMMENTS
Patient s/p vaginal delivery   asymptomatic   uterus well contracted   normal lochial flow   no calf tenderness  discharge home with instructions

## 2023-11-06 ENCOUNTER — APPOINTMENT (OUTPATIENT)
Dept: OBGYN | Facility: CLINIC | Age: 38
End: 2023-11-06

## 2023-11-08 ENCOUNTER — APPOINTMENT (OUTPATIENT)
Dept: ANTEPARTUM | Facility: CLINIC | Age: 38
End: 2023-11-08

## 2023-11-08 DIAGNOSIS — K62.89 OTHER SPECIFIED DISEASES OF ANUS AND RECTUM: ICD-10-CM

## 2023-11-08 DIAGNOSIS — Z3A.40 40 WEEKS GESTATION OF PREGNANCY: ICD-10-CM

## 2023-12-20 ENCOUNTER — APPOINTMENT (OUTPATIENT)
Dept: OBGYN | Facility: CLINIC | Age: 38
End: 2023-12-20
Payer: COMMERCIAL

## 2023-12-20 VITALS
SYSTOLIC BLOOD PRESSURE: 116 MMHG | HEIGHT: 62 IN | WEIGHT: 130 LBS | BODY MASS INDEX: 23.92 KG/M2 | DIASTOLIC BLOOD PRESSURE: 72 MMHG

## 2023-12-20 PROCEDURE — 0503F POSTPARTUM CARE VISIT: CPT

## 2023-12-20 RX ORDER — AMOXICILLIN 500 MG/1
500 CAPSULE ORAL 3 TIMES DAILY
Qty: 21 | Refills: 0 | Status: DISCONTINUED | COMMUNITY
Start: 2023-04-06 | End: 2023-12-20

## 2023-12-23 NOTE — HISTORY OF PRESENT ILLNESS
[Postpartum Follow Up] : postpartum follow up [Delivery Date: ___] : on [unfilled] [] : delivered by vaginal delivery [Male] : Delivery History: baby boy [Breastfeeding] : currently nursing [Back to Normal] : is back to normal in size [None] : no vaginal bleeding [Normal] : the vagina was normal [Examination Of The Breasts] : breasts are normal [Doing Well] : is doing well [No Sign of Infection] : is showing no signs of infection [FreeTextEntry8] : 37yo  female s/p  11/3/23 presents today for postpartum exam. She is currently breastfeeding. She denies any issues with bowel/bladder. She reports good support at home. She is taking PNVs and hydrating well.Denies any thoughts of harming herself or baby. Baby is doing well. [de-identified] : Continue hydration and prenatal vitamins. Discussed contraceptive options (IUD,implant, POP). Pt will decide and return for insertion if desires IUD/implant. RTO 4months annual exam

## 2024-04-10 ENCOUNTER — APPOINTMENT (OUTPATIENT)
Dept: OBGYN | Facility: CLINIC | Age: 39
End: 2024-04-10
Payer: COMMERCIAL

## 2024-04-10 VITALS — BODY MASS INDEX: 21.77 KG/M2 | SYSTOLIC BLOOD PRESSURE: 108 MMHG | DIASTOLIC BLOOD PRESSURE: 62 MMHG | WEIGHT: 119 LBS

## 2024-04-10 DIAGNOSIS — Z12.4 ENCOUNTER FOR SCREENING FOR MALIGNANT NEOPLASM OF CERVIX: ICD-10-CM

## 2024-04-10 DIAGNOSIS — Z01.419 ENCOUNTER FOR GYNECOLOGICAL EXAMINATION (GENERAL) (ROUTINE) W/OUT ABNORMAL FINDINGS: ICD-10-CM

## 2024-04-10 PROCEDURE — 99395 PREV VISIT EST AGE 18-39: CPT

## 2024-04-11 NOTE — DISCUSSION/SUMMARY
[FreeTextEntry1] : Health Maintenance:   -Pap with HPV -TBSE -Using condoms for contraception. Discussed progestin options (R/B/SE/AE reviewed), she will continue condoms for now

## 2024-04-11 NOTE — HISTORY OF PRESENT ILLNESS
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

## 2024-04-15 LAB — HPV HIGH+LOW RISK DNA PNL CVX: NOT DETECTED

## 2024-04-17 LAB — CYTOLOGY CVX/VAG DOC THIN PREP: ABNORMAL

## 2025-04-11 DIAGNOSIS — O09.299 SUPERVISION OF PREGNANCY WITH OTHER POOR REPRODUCTIVE OR OBSTETRIC HISTORY, UNSPECIFIED TRIMESTER: ICD-10-CM

## 2025-04-11 DIAGNOSIS — Z34.82 ENCOUNTER FOR SUPERVISION OF OTHER NORMAL PREGNANCY, SECOND TRIMESTER: ICD-10-CM

## 2025-04-11 DIAGNOSIS — Z34.93 ENCOUNTER FOR SUPERVISION OF NORMAL PREGNANCY, UNSPECIFIED, THIRD TRIMESTER: ICD-10-CM

## 2025-04-11 DIAGNOSIS — O09.899 ABNORMAL HEMATOLOGICAL FINDING ON ANTENATAL SCREENING OF MOTHER: ICD-10-CM

## 2025-04-11 DIAGNOSIS — Z34.92 ENCOUNTER FOR SUPERVISION OF NORMAL PREGNANCY, UNSPECIFIED, SECOND TRIMESTER: ICD-10-CM

## 2025-04-11 DIAGNOSIS — R10.9 OTHER SPECIFIED PREGNANCY RELATED CONDITIONS, UNSPECIFIED TRIMESTER: ICD-10-CM

## 2025-04-11 DIAGNOSIS — O35.EXX0 MATERNAL CARE FOR OTHER (SUSPECTED) FETAL ABNORMALITY AND DAMAGE, FETAL GENITOURINARY ANOMALIES, NOT APPLICABLE OR UNSPECIFIED: ICD-10-CM

## 2025-04-11 DIAGNOSIS — O26.899 OTHER SPECIFIED PREGNANCY RELATED CONDITIONS, UNSPECIFIED TRIMESTER: ICD-10-CM

## 2025-04-11 DIAGNOSIS — O28.0 ABNORMAL HEMATOLOGICAL FINDING ON ANTENATAL SCREENING OF MOTHER: ICD-10-CM

## 2025-04-11 DIAGNOSIS — O09.891 SUPERVISION OF OTHER HIGH RISK PREGNANCIES, FIRST TRIMESTER: ICD-10-CM

## 2025-04-11 DIAGNOSIS — O09.521 SUPERVISION OF ELDERLY MULTIGRAVIDA, FIRST TRIMESTER: ICD-10-CM

## 2025-04-14 ENCOUNTER — NON-APPOINTMENT (OUTPATIENT)
Age: 40
End: 2025-04-14

## 2025-04-14 ENCOUNTER — APPOINTMENT (OUTPATIENT)
Dept: OBGYN | Facility: CLINIC | Age: 40
End: 2025-04-14
Payer: COMMERCIAL

## 2025-04-14 VITALS
BODY MASS INDEX: 21.53 KG/M2 | SYSTOLIC BLOOD PRESSURE: 110 MMHG | DIASTOLIC BLOOD PRESSURE: 64 MMHG | HEIGHT: 62 IN | WEIGHT: 117 LBS

## 2025-04-14 DIAGNOSIS — K62.89 OTHER SPECIFIED DISEASES OF ANUS AND RECTUM: ICD-10-CM

## 2025-04-14 DIAGNOSIS — Z01.419 ENCOUNTER FOR GYNECOLOGICAL EXAMINATION (GENERAL) (ROUTINE) W/OUT ABNORMAL FINDINGS: ICD-10-CM

## 2025-04-14 DIAGNOSIS — Z12.39 ENCOUNTER FOR OTHER SCREENING FOR MALIGNANT NEOPLASM OF BREAST: ICD-10-CM

## 2025-04-14 PROCEDURE — 99395 PREV VISIT EST AGE 18-39: CPT

## 2025-06-23 ENCOUNTER — APPOINTMENT (OUTPATIENT)
Dept: OBGYN | Facility: CLINIC | Age: 40
End: 2025-06-23
Payer: COMMERCIAL

## 2025-06-23 VITALS
HEIGHT: 62 IN | WEIGHT: 118 LBS | TEMPERATURE: 97.3 F | DIASTOLIC BLOOD PRESSURE: 80 MMHG | BODY MASS INDEX: 21.71 KG/M2 | OXYGEN SATURATION: 98 % | SYSTOLIC BLOOD PRESSURE: 116 MMHG | HEART RATE: 73 BPM

## 2025-06-23 LAB
APPEARANCE: CLEAR
BLOOD URINE: NEGATIVE
COLOR: YELLOW
GLUCOSE QUALITATIVE U: NEGATIVE
KETONES URINE: NEGATIVE
LEUKOCYTE ESTERASE URINE: NORMAL
NITRITE URINE: NEGATIVE
PH URINE: 7
PROTEIN URINE: NEGATIVE
SPECIFIC GRAVITY URINE: 1.01
URINALYSIS: NORMAL

## 2025-06-23 PROCEDURE — 99213 OFFICE O/P EST LOW 20 MIN: CPT

## 2025-06-23 RX ORDER — NYSTATIN AND TRIAMCINOLONE ACETONIDE 100000; 1 [USP'U]/G; MG/G
100000-0.1 OINTMENT TOPICAL
Qty: 1 | Refills: 0 | Status: ACTIVE | COMMUNITY
Start: 2025-06-23 | End: 1900-01-01